# Patient Record
Sex: FEMALE | Race: BLACK OR AFRICAN AMERICAN | NOT HISPANIC OR LATINO | Employment: FULL TIME
[De-identification: names, ages, dates, MRNs, and addresses within clinical notes are randomized per-mention and may not be internally consistent; named-entity substitution may affect disease eponyms.]

---

## 2017-10-01 ENCOUNTER — HEALTH MAINTENANCE LETTER (OUTPATIENT)
Age: 27
End: 2017-10-01

## 2020-08-04 ENCOUNTER — OFFICE VISIT (OUTPATIENT)
Dept: FAMILY MEDICINE | Facility: CLINIC | Age: 30
End: 2020-08-04
Payer: COMMERCIAL

## 2020-08-04 VITALS
WEIGHT: 124 LBS | HEART RATE: 88 BPM | SYSTOLIC BLOOD PRESSURE: 103 MMHG | DIASTOLIC BLOOD PRESSURE: 62 MMHG | TEMPERATURE: 98.4 F | HEIGHT: 62 IN | BODY MASS INDEX: 22.82 KG/M2

## 2020-08-04 DIAGNOSIS — R26.89 BALANCE PROBLEMS: ICD-10-CM

## 2020-08-04 DIAGNOSIS — R29.818 ABNORMAL ROMBERG TEST: ICD-10-CM

## 2020-08-04 DIAGNOSIS — Z12.4 SCREENING FOR MALIGNANT NEOPLASM OF CERVIX: Primary | ICD-10-CM

## 2020-08-04 DIAGNOSIS — Z00.00 ROUTINE GENERAL MEDICAL EXAMINATION AT A HEALTH CARE FACILITY: ICD-10-CM

## 2020-08-04 LAB
BASOPHILS # BLD AUTO: 0 10E9/L (ref 0–0.2)
BASOPHILS NFR BLD AUTO: 0.4 %
DIFFERENTIAL METHOD BLD: NORMAL
EOSINOPHIL # BLD AUTO: 0.3 10E9/L (ref 0–0.7)
EOSINOPHIL NFR BLD AUTO: 3.2 %
ERYTHROCYTE [DISTWIDTH] IN BLOOD BY AUTOMATED COUNT: 12.6 % (ref 10–15)
HCT VFR BLD AUTO: 40.4 % (ref 35–47)
HGB BLD-MCNC: 14 G/DL (ref 11.7–15.7)
LYMPHOCYTES # BLD AUTO: 1.9 10E9/L (ref 0.8–5.3)
LYMPHOCYTES NFR BLD AUTO: 21.7 %
MCH RBC QN AUTO: 31 PG (ref 26.5–33)
MCHC RBC AUTO-ENTMCNC: 34.7 G/DL (ref 31.5–36.5)
MCV RBC AUTO: 89 FL (ref 78–100)
MONOCYTES # BLD AUTO: 0.5 10E9/L (ref 0–1.3)
MONOCYTES NFR BLD AUTO: 6 %
NEUTROPHILS # BLD AUTO: 6.1 10E9/L (ref 1.6–8.3)
NEUTROPHILS NFR BLD AUTO: 68.7 %
PLATELET # BLD AUTO: 254 10E9/L (ref 150–450)
RBC # BLD AUTO: 4.52 10E12/L (ref 3.8–5.2)
VIT B12 SERPL-MCNC: 2911 PG/ML (ref 193–986)
WBC # BLD AUTO: 8.9 10E9/L (ref 4–11)

## 2020-08-04 PROCEDURE — 99214 OFFICE O/P EST MOD 30 MIN: CPT | Mod: 25 | Performed by: NURSE PRACTITIONER

## 2020-08-04 PROCEDURE — G0145 SCR C/V CYTO,THINLAYER,RESCR: HCPCS | Performed by: NURSE PRACTITIONER

## 2020-08-04 PROCEDURE — 36415 COLL VENOUS BLD VENIPUNCTURE: CPT | Performed by: NURSE PRACTITIONER

## 2020-08-04 PROCEDURE — 80048 BASIC METABOLIC PNL TOTAL CA: CPT | Performed by: NURSE PRACTITIONER

## 2020-08-04 PROCEDURE — 82607 VITAMIN B-12: CPT | Performed by: NURSE PRACTITIONER

## 2020-08-04 PROCEDURE — 85025 COMPLETE CBC W/AUTO DIFF WBC: CPT | Performed by: NURSE PRACTITIONER

## 2020-08-04 PROCEDURE — 99385 PREV VISIT NEW AGE 18-39: CPT | Performed by: NURSE PRACTITIONER

## 2020-08-04 RX ORDER — UBIDECARENONE 75 MG
100 CAPSULE ORAL DAILY
COMMUNITY
End: 2020-08-25

## 2020-08-04 ASSESSMENT — MIFFLIN-ST. JEOR: SCORE: 1232.77

## 2020-08-04 NOTE — LETTER
August 7, 2020      Ramonita Trujillo  [address]   [place] [address]    Dear ,      I am happy to inform you that your recent cervical cancer screening test (PAP smear) was normal.      Preventative screenings such as this help to ensure your health for years to come. You should repeat a pap smear in 3 years, unless otherwise directed.      You will still need to return to the clinic every year for your annual exam and other preventive tests.     If you have additional questions regarding this result, please call our registered nurse, Fanny at [phone].      Sincerely,      BRENT Neri CNP//[facility]

## 2020-08-04 NOTE — PROGRESS NOTES
"   SUBJECTIVE:   CC: Ramonita Trujillo is an 30 year old woman who presents for preventive health visit.     Healthy Habits:    Do you get at least three servings of calcium containing foods daily (dairy, green leafy vegetables, etc.)? yes    Amount of exercise or daily activities, outside of work: 6 day(s) per week    Problems taking medications regularly No    Medication side effects: No    Have you had an eye exam in the past two years? no    Do you see a dentist twice per year? no    Do you have sleep apnea, excessive snoring or daytime drowsiness?no    Says she went to the chiropractor after having a very \" odd\" experience with her lower back.  Says she was at a barbecue and [facility] and suddenly she had lower back weakness and she was unable to even lift her plate onto the table.  She said it was not truly \" back pain\" but rather her lower back was \"giving out on her\".  She denies any upper or lower extremity weakness or numbness or tingling.  She denies any visual disturbance.  She has no headaches.  She has no gait abnormalities.  She admits that her balance is been off and when she was evaluated by the chiropractor he noted a positive Romberg test.  She says she has been \"off balance\" throughout her adult life although she notes that when she was younger she was well coordinated.  Her chiropractor wanted provider to run certain lab tests because sometimes vitamin B12 is associated with balance issues.  Since seeing the chiropractor she has started taking vitamin B supplements.      Today's PHQ-2 Score:   PHQ-2 ( 1999 [employer]) 10/7/2013 7/12/2012   Q1: Little interest or pleasure in doing things 0 0   Q2: Feeling down, depressed or hopeless 0 0   PHQ-2 Score 0 0       Abuse: Current or Past(Physical, Sexual or Emotional)- No  Do you feel safe in your environment? No        Social History     Tobacco Use     Smoking status: Never Smoker     Smokeless tobacco: Never Used   Substance " Use Topics     Alcohol use: No     If you drink alcohol do you typically have >3 drinks per day or >7 drinks per week? No                     Reviewed orders with patient.  Reviewed health maintenance and updated orders accordingly - Yes  Lab work is in process    Mammogram not appropriate for this patient based on age.    Pertinent mammograms are reviewed under the imaging tab.  History of abnormal Pap smear: NO - age 21-29 PAP every 3 years recommended  PAP / HPV 7/12/2012 5/19/2010   PAP NIL ASC-US(A)     Reviewed and updated as needed this visit by clinical staff         Reviewed and updated as needed this visit by Provider            ROS:  CONSTITUTIONAL: NEGATIVE for fever, chills, change in weight  INTEGUMENTARU/SKIN: NEGATIVE for worrisome rashes, moles or lesions  EYES: NEGATIVE for vision changes or irritation  ENT: NEGATIVE for ear, mouth and throat problems  RESP: NEGATIVE for significant cough or SOB  BREAST: NEGATIVE for masses, tenderness or discharge  CV: NEGATIVE for chest pain, palpitations or peripheral edema  GI: NEGATIVE for nausea, abdominal pain, heartburn, or change in bowel habits  : NEGATIVE for unusual urinary or vaginal symptoms. Periods are regular.  MUSCULOSKELETAL: Lower back discomfort  NEURO: Balance issues  PSYCHIATRIC: NEGATIVE for changes in mood or affect    OBJECTIVE:   There were no vitals taken for this visit.  EXAM:  GENERAL: healthy, alert and no distress  EYES: Eyes grossly normal to inspection, PERRL and conjunctivae and sclerae normal  HENT: ear canals and TM's normal, nose and mouth without ulcers or lesions  NECK: no adenopathy, no asymmetry, masses, or scars and thyroid normal to palpation  RESP: lungs clear to auscultation - no rales, rhonchi or wheezes  BREAST: normal without masses, tenderness or nipple discharge and no palpable axillary masses or adenopathy  CV: regular rate and rhythm, normal S1 S2, no S3 or S4, no murmur, click or rub, no peripheral edema and  peripheral pulses strong  ABDOMEN: soft, nontender, no hepatosplenomegaly, no masses and bowel sounds normal  MS: no gross musculoskeletal defects noted, no edema  SKIN: no suspicious lesions or rashes  NEURO: Normal strength and tone, sensory exam grossly normal, mentation intact, Romberg abnormal and she is unable to tandem walk without being off balance.  DTRs lower extremity normal bilaterally.  PSYCH: mentation appears normal, affect normal/bright  LYMPH: no cervical, supraclavicular, axillary, or inguinal adenopathy    Diagnostic Test Results:  Labs reviewed in Epic  Results for orders placed or performed in visit on 08/04/20 (from the past 24 hour(s))   Vitamin B12   Result Value Ref Range    Vitamin B12 2,911 (H) 193 - 986 pg/mL   CBC with platelets and differential   Result Value Ref Range    WBC 8.9 4.0 - 11.0 10e9/L    RBC Count 4.52 3.8 - 5.2 10e12/L    Hemoglobin 14.0 11.7 - 15.7 g/dL    Hematocrit 40.4 35.0 - 47.0 %    MCV 89 78 - 100 fl    MCH 31.0 26.5 - 33.0 pg    MCHC 34.7 31.5 - 36.5 g/dL    RDW 12.6 10.0 - 15.0 %    Platelet Count 254 150 - 450 10e9/L    % Neutrophils 68.7 %    % Lymphocytes 21.7 %    % Monocytes 6.0 %    % Eosinophils 3.2 %    % Basophils 0.4 %    Absolute Neutrophil 6.1 1.6 - 8.3 10e9/L    Absolute Lymphocytes 1.9 0.8 - 5.3 10e9/L    Absolute Monocytes 0.5 0.0 - 1.3 10e9/L    Absolute Eosinophils 0.3 0.0 - 0.7 10e9/L    Absolute Basophils 0.0 0.0 - 0.2 10e9/L    Diff Method Automated Method    Basic metabolic panel  (Ca, Cl, CO2, Creat, Gluc, K, Na, BUN)   Result Value Ref Range    Sodium 141 133 - 144 mmol/L    Potassium 4.6 3.4 - 5.3 mmol/L    Chloride 110 (H) 94 - 109 mmol/L    Carbon Dioxide 26 20 - 32 mmol/L    Anion Gap 5 3 - 14 mmol/L    Glucose 92 70 - 99 mg/dL    Urea Nitrogen 8 7 - 30 mg/dL    Creatinine 0.73 0.52 - 1.04 mg/dL    GFR Estimate >90 >60 mL/min/[1.73_m2]    GFR Estimate If Black >90 >60 mL/min/[1.73_m2]    Calcium 8.2 (L) 8.5 - 10.1 mg/dL  "      ASSESSMENT/PLAN:       ICD-10-CM    1. Screening for malignant neoplasm of cervix  Z12.4 Pap imaged thin layer screen reflex to HPV if ASCUS - recommend age 25 - 29   2. Routine general medical examination at a health care facility  Z00.00 HIV Screening   3. Abnormal Romberg test  R29.818 NEUROLOGY ADULT REFERRAL   4. Balance problems  R26.89 Vitamin B12     CBC with platelets and differential     Basic metabolic panel  (Ca, Cl, CO2, Creat, Gluc, K, Na, BUN)     NEUROLOGY ADULT REFERRAL       Routine physical today.  I am not entirely sure what is going on with her balance issues and whether or not there is a more serious cause for this.  Her Romberg was positive today.  Her B12 returned 3 times normal but she has been taking supplements which I advised the nurse to call patient and tell her to stop taking these.  I have referred her to neurology for further evaluation.  COUNSELING:   Reviewed preventive health counseling, as reflected in patient instructions       Regular exercise       Healthy diet/nutrition    Estimated body mass index is 24.01 kg/m  as calculated from the following:    Height as of 3/21/14: 1.537 m (5' 0.5\").    Weight as of 3/21/14: 56.7 kg (125 lb).         reports that she has never smoked. She has never used smokeless tobacco.      Counseling Resources:  ATP IV Guidelines  Pooled Cohorts Equation Calculator  Breast Cancer Risk Calculator  FRAX Risk Assessment  ICSI Preventive Guidelines  Dietary Guidelines for Americans, 2010  USDA's MyPlate  ASA Prophylaxis  Lung CA Screening    BRENT Neri [facility]  "

## 2020-08-05 ENCOUNTER — TELEPHONE (OUTPATIENT)
Dept: FAMILY MEDICINE | Facility: CLINIC | Age: 30
End: 2020-08-05

## 2020-08-05 DIAGNOSIS — R79.89 ELEVATED VITAMIN B12 LEVEL: Primary | ICD-10-CM

## 2020-08-05 LAB
ANION GAP SERPL CALCULATED.3IONS-SCNC: 5 MMOL/L (ref 3–14)
BUN SERPL-MCNC: 8 MG/DL (ref 7–30)
CALCIUM SERPL-MCNC: 8.2 MG/DL (ref 8.5–10.1)
CHLORIDE SERPL-SCNC: 110 MMOL/L (ref 94–109)
CO2 SERPL-SCNC: 26 MMOL/L (ref 20–32)
CREAT SERPL-MCNC: 0.73 MG/DL (ref 0.52–1.04)
GFR SERPL CREATININE-BSD FRML MDRD: >90 ML/MIN/{1.73_M2}
GLUCOSE SERPL-MCNC: 92 MG/DL (ref 70–99)
POTASSIUM SERPL-SCNC: 4.6 MMOL/L (ref 3.4–5.3)
SODIUM SERPL-SCNC: 141 MMOL/L (ref 133–144)

## 2020-08-05 NOTE — TELEPHONE ENCOUNTER
Lab appt scheduled for 9/21/20 and order for B12 placed for provider co-signature.    Isabelle Hameed RN

## 2020-08-06 LAB
COPATH REPORT: NORMAL
PAP: NORMAL

## 2020-08-24 NOTE — PROGRESS NOTES
INITIAL NEUROLOGY CONSULTATION    DATE OF VISIT: 8/25/2020  CLINIC LOCATION: [facility]  MRN: [id number]  PATIENT NAME: Ramonita Trujillo  YOB: 1990    PRIMARY CARE PROVIDER: Jim Bradley MD     REASON FOR VISIT:   Chief Complaint   Patient presents with     Balance/ Vestibular     HISTORY OF PRESENT ILLNESS:                                                    Ms. Ramonita Trujillo is 30 year old right handed female patient without significant past medical history, who was seen in consultation today requested by Corina Larry CNP, for balance difficulty.    Per patient's report, she developed urinary urgency in the beginning of 2017, it gradually improved, but continues to be an issue.  Then, later in the same year (2017) she started to experience difficulty with balance.  At that time she had very short couch and usually slept with crossed and bent legs for the entire night.  When she got up in the morning, her both legs felt weak, but it was resolving as the day went on.  She does not sleeping in this position for the last 2.5 years, but occasionally her legs tends to bend on their own at night, and she has to put conscious effort to straighten them.    Her balance difficulty continues.  She noticed that it is worse when she smokes marijuana (used to do it daily since 2009, but lately was not using it that frequently).  For the last 2.5 weeks did not smoke any until the last weekend and noticed that balance was better.  She used it again last weekend, and after that noticed balance difficulty again.  It worsens when she bends over and better when she is stretching.  In July she had an episode of significant low back pain that was associated with difficulty with balance.  No other aggravating or alleviating factors.  No other associated symptoms, including additional focal neurological complaints.  No history of significant head injuries, seizures, or CNS  infections.    She tried vitamin B12 replacement on the advice of her chiropractor, but stopped it after her vitamin B12 came back high.  No other treatments tried.    Recent laboratory evaluation from August 2020 includes elevated chloride (110) and low calcium (8.2) on BMP, elevated vitamin B12 level (2911), and normal CBC.    No prior brain or spine imaging.    No additional useful information is available in Care Everywhere, which was reviewed.    Review of Systems - the patient endorses easy skin bruising (previously discussed). Otherwise, she denies any other complaints on 14-point comprehensive review of systems.  PAST MEDICAL/SURGICAL HISTORY:                                                    I personally reviewed patient's past medical and surgical history with the patient at today's visit.  Patient Active Problem List   Diagnosis     CARDIOVASCULAR SCREENING; LDL GOAL LESS THAN 160     Past Medical History:   Diagnosis Date     Atypical squamous cells of undetermined significance (ASCUS) on Papanicolaou smear of cervix 5/19/2010 5/19/2010 ASCUS pap     Past Surgical History:   Procedure Laterality Date     NO HISTORY OF SURGERY       MEDICATIONS:                                                    I personally reviewed patient's medications and allergies with the patient at today's visit.  No current outpatient medications on file prior to visit.  No current facility-administered medications on file prior to visit.     ALLERGIES:                                                    No Known Allergies  FAMILY/SOCIAL HISTORY:                                                    Family and social history was reviewed with the patient at today's visit.  No family history of neurological disorders.  Never smoker.  Denies alcohol use.  Smokes marijuana.  Denies other recreational drug use.  Single, lives alone.  Works full-time in a warehouse.  REVIEW OF SYSTEMS:                                                     Patient has completed a [facility] Patient Health History, including a 14-system review, which was personally reviewed, and pertinent positives are listed in HPI. She denies any additional problems on the further questioning.  EXAM:                                                    VITAL SIGNS:   /60   Pulse 103   Temp 99.3  F (37.4  C) (Oral)   LMP 08/24/2020 (Exact Date)   SpO2 99%   Mini-Cog Assessment:  Mini Cog Assessment  Clock Draw Score: 2 Normal  3 Item Recall: 3 objects recalled  Mini Cog Total Score: 5    General: pt is in NAD, cooperative.  Skin: normal turgor, moist mucous membranes, no lesions/rashes noticed.  HEENT: ATNC, EOMI, PERRL, white sclera, normal conjunctiva, no nystagmus or ptosis. No carotid bruits bilaterally.  Respiratory: lung sounds clear to auscultation bilaterally, no crackles, wheezes, rhonchi. Symmetric lung excursion, no accessory respiratory muscle use.  Cardiovascular: normal S1/S2, no murmurs/rubs/gallops.   Abdomen: Not distended.  : deferred.    Neurological:  Mental: alert, follows commands, Mini Cog Total Score: 5/5 with 3/3 on memory recall, no aphasia or dysarthria. Fund of knowledge is appropriate for age.  Cranial Nerves:  CN II: visual acuity - able to accurately count fingers with each eye. Visual fields intact, fundi: discs sharp, no papilledema and normal vessels bilaterally.  CN III, IV, VI: EOM intact, pupils equal and reactive  CN V: facial sensation nl  CN VII: face symmetric, no facial droop  CN VIII: hearing normal  CN IX: palate elevation symmetric, uvula at midline  CN XI SCM normal, shoulder shrug nl  CN XII: tongue midline  Motor: Strength: 5/5 in all major groups of both upper extremities, 4/5 in both lower extremities. Normal tone. No abnormal movements. No pronator drift b/l.  Reflexes: Triceps, biceps, and brachioradialis reflexes are slightly brisk, but symmetric bilaterally, patellar and achilles reflexes are brisk, but also  symmetric. No sustained clonus noted, though 2-3 beats of ankle clonus were noted bilaterally. Toes are equivocal b/l.   Sensory: temperature, light touch, and pinprick are intact, and vibration sense is reduced in both feet. Romberg: negative, although the patient sways slightly side to side.  Coordination: FNF are intact, and heel-shin tests are dysmetric b/l.  Gait: Unsteady gait, has significant difficulty with tandem.  DATA:   LABS/IMAGING/OTHER STUDIES: I reviewed pertinent medical records, including Care Everywhere, as detailed in the history of present illness.  ASSESSMENT AND PLAN:      ASSESSMENT: Ramonita Trujillo is a 30 year old female patient with listed above past medical history, who presents with chronic balance difficulty.    We had a detailed discussion with the patient regarding her presenting complaints.  The neurological exam today is notable for unsteady gait, brisker lower extremity reflexes with mild bilateral lower extremity weakness and dysmetria.  The clinical presentation might be consistent with demyelinating conditions, lesions of the brain (especially cerebellum) or spinal cord, and others (metabolic disorders/vitamin deficiencies, marijuana use, etc.).  As a first step I ordered brain, cervical and thoracic spine with and without contrast.  I advised the patient to stop smoking marijuana completely.  We might add additional labs and potentially EMG if no causes are found.    DIAGNOSES:    ICD-10-CM    1. Difficulty balancing  R29.818      PLAN: At today's visit we thoroughly discussed various diagnostic possibilities for patient's symptoms, necessary evaluation, and the plan, which includes:  Orders Placed This Encounter   Procedures     MR Brain w/o & w Contrast     MR Cervical Spine w/o & w Contrast     MR Thoracic Spine w/o & w Contrast     No new medications.     Additional recommendations after the work-up.    Next follow-up appointment is in the next 2 weeks or earlier if  needed.    Total Time:  81 minutes with > 50% spent counseling the patient on stated above assessment and recommendations, including nature of the diagnosis, needed w/u, and proposed plan.  Additional time was used to answer questions regarding patient's symptoms, my recommendations, and the plan.    Earl Pritchett MD  [facility] Neurology  Neon  (Chart documentation was completed in part with Dragon voice-recognition software. Even though reviewed, some grammatical, spelling, and word errors may remain.)

## 2020-08-25 ENCOUNTER — OFFICE VISIT (OUTPATIENT)
Dept: NEUROLOGY | Facility: CLINIC | Age: 30
End: 2020-08-25
Payer: COMMERCIAL

## 2020-08-25 VITALS
SYSTOLIC BLOOD PRESSURE: 106 MMHG | HEART RATE: 103 BPM | OXYGEN SATURATION: 99 % | TEMPERATURE: 99.3 F | DIASTOLIC BLOOD PRESSURE: 60 MMHG

## 2020-08-25 DIAGNOSIS — R29.818 DIFFICULTY BALANCING: Primary | ICD-10-CM

## 2020-08-25 PROCEDURE — 99245 OFF/OP CONSLTJ NEW/EST HI 55: CPT | Performed by: PSYCHIATRY & NEUROLOGY

## 2020-08-25 NOTE — PATIENT INSTRUCTIONS
AFTER VISIT SUMMARY (AVS):    At today's visit we thoroughly discussed various diagnostic possibilities for your symptoms, necessary evaluation, and the plan, which includes:  Orders Placed This Encounter   Procedures     MR Brain w/o & w Contrast     MR Cervical Spine w/o & w Contrast     MR Thoracic Spine w/o & w Contrast     No new medications.     Additional recommendations after the work-up.    Next follow-up appointment is in the next 2 weeks or earlier if needed.    Please do not hesitate to call me with any questions or concerns.    Thanks.

## 2020-08-25 NOTE — LETTER
8/25/2020         RE: Ramonita Trujillo  [address]  [address]  [facility] [address]        Dear Colleague,    Thank you for referring your patient, Ramonita Trujillo, to the [facility]. Please see a copy of my visit note below.    INITIAL NEUROLOGY CONSULTATION    DATE OF VISIT: 8/25/2020  CLINIC LOCATION: [facility]  MRN: [id number]  PATIENT NAME: Ramonita Trujillo  YOB: 1990    PRIMARY CARE PROVIDER: Jim Bradley MD     REASON FOR VISIT:   Chief Complaint   Patient presents with     Balance/ Vestibular     HISTORY OF PRESENT ILLNESS:                                                    Ms. Ramnoita Trujillo is 30 year old right handed female patient without significant past medical history, who was seen in consultation today requested by Corina Larry CNP, for balance difficulty.    Per patient's report, she developed urinary urgency in the beginning of 2017, it gradually improved, but continues to be an issue.  Then, later in the same year (2017) she started to experience difficulty with balance.  At that time she had very short couch and usually slept with crossed and bent legs for the entire night.  When she got up in the morning, her both legs felt weak, but it was resolving as the day went on.  She does not sleeping in this position for the last 2.5 years, but occasionally her legs tends to bend on their own at night, and she has to put conscious effort to straighten them.    Her balance difficulty continues.  She noticed that it is worse when she smokes marijuana (used to do it daily since 2009, but lately was not using it that frequently).  For the last 2.5 weeks did not smoke any until the last weekend and noticed that balance was better.  She used it again last weekend, and after that noticed balance difficulty again.  It worsens when she bends over and better when she is stretching.  In July she had an episode of significant low  back pain that was associated with difficulty with balance.  No other aggravating or alleviating factors.  No other associated symptoms, including additional focal neurological complaints.  No history of significant head injuries, seizures, or CNS infections.    She tried vitamin B12 replacement on the advice of her chiropractor, but stopped it after her vitamin B12 came back high.  No other treatments tried.    Recent laboratory evaluation from August 2020 includes elevated chloride (110) and low calcium (8.2) on BMP, elevated vitamin B12 level (2911), and normal CBC.    No prior brain or spine imaging.    No additional useful information is available in Care Everywhere, which was reviewed.    Review of Systems - the patient endorses easy skin bruising (previously discussed). Otherwise, she denies any other complaints on 14-point comprehensive review of systems.  PAST MEDICAL/SURGICAL HISTORY:                                                    I personally reviewed patient's past medical and surgical history with the patient at today's visit.  Patient Active Problem List   Diagnosis     CARDIOVASCULAR SCREENING; LDL GOAL LESS THAN 160     Past Medical History:   Diagnosis Date     Atypical squamous cells of undetermined significance (ASCUS) on Papanicolaou smear of cervix 5/19/2010 5/19/2010 ASCUS pap     Past Surgical History:   Procedure Laterality Date     NO HISTORY OF SURGERY       MEDICATIONS:                                                    I personally reviewed patient's medications and allergies with the patient at today's visit.  No current outpatient medications on file prior to visit.  No current facility-administered medications on file prior to visit.     ALLERGIES:                                                    No Known Allergies  FAMILY/SOCIAL HISTORY:                                                    Family and social history was reviewed with the patient at today's visit.  No family  history of neurological disorders.  Never smoker.  Denies alcohol use.  Smokes marijuana.  Denies other recreational drug use.  Single, lives alone.  Works full-time in a warehouse.  REVIEW OF SYSTEMS:                                                    Patient has completed a [facility] Patient Health History, including a 14-system review, which was personally reviewed, and pertinent positives are listed in HPI. She denies any additional problems on the further questioning.  EXAM:                                                    VITAL SIGNS:   /60   Pulse 103   Temp 99.3  F (37.4  C) (Oral)   LMP 08/24/2020 (Exact Date)   SpO2 99%   Mini-Cog Assessment:  Mini Cog Assessment  Clock Draw Score: 2 Normal  3 Item Recall: 3 objects recalled  Mini Cog Total Score: 5    General: pt is in NAD, cooperative.  Skin: normal turgor, moist mucous membranes, no lesions/rashes noticed.  HEENT: ATNC, EOMI, PERRL, white sclera, normal conjunctiva, no nystagmus or ptosis. No carotid bruits bilaterally.  Respiratory: lung sounds clear to auscultation bilaterally, no crackles, wheezes, rhonchi. Symmetric lung excursion, no accessory respiratory muscle use.  Cardiovascular: normal S1/S2, no murmurs/rubs/gallops.   Abdomen: Not distended.  : deferred.    Neurological:  Mental: alert, follows commands, Mini Cog Total Score: 5/5 with 3/3 on memory recall, no aphasia or dysarthria. Fund of knowledge is appropriate for age.  Cranial Nerves:  CN II: visual acuity - able to accurately count fingers with each eye. Visual fields intact, fundi: discs sharp, no papilledema and normal vessels bilaterally.  CN III, IV, VI: EOM intact, pupils equal and reactive  CN V: facial sensation nl  CN VII: face symmetric, no facial droop  CN VIII: hearing normal  CN IX: palate elevation symmetric, uvula at midline  CN XI SCM normal, shoulder shrug nl  CN XII: tongue midline  Motor: Strength: 5/5 in all major groups of both upper  extremities, 4/5 in both lower extremities. Normal tone. No abnormal movements. No pronator drift b/l.  Reflexes: Triceps, biceps, and brachioradialis reflexes are slightly brisk, but symmetric bilaterally, patellar and achilles reflexes are brisk, but also symmetric. No sustained clonus noted, though 2-3 beats of ankle clonus were noted bilaterally. Toes are equivocal b/l.   Sensory: temperature, light touch, and pinprick are intact, and vibration sense is reduced in both feet. Romberg: negative, although the patient sways slightly side to side.  Coordination: FNF are intact, and heel-shin tests are dysmetric b/l.  Gait: Unsteady gait, has significant difficulty with tandem.  DATA:   LABS/IMAGING/OTHER STUDIES: I reviewed pertinent medical records, including Care Everywhere, as detailed in the history of present illness.  ASSESSMENT AND PLAN:      ASSESSMENT: Ramonita Trujillo is a 30 year old female patient with listed above past medical history, who presents with chronic balance difficulty.    We had a detailed discussion with the patient regarding her presenting complaints.  The neurological exam today is notable for unsteady gait, brisker lower extremity reflexes with mild bilateral lower extremity weakness and dysmetria.  The clinical presentation might be consistent with demyelinating conditions, lesions of the brain (especially cerebellum) or spinal cord, and others (metabolic disorders/vitamin deficiencies, marijuana use, etc.).  As a first step I ordered brain, cervical and thoracic spine with and without contrast.  I advised the patient to stop smoking marijuana completely.  We might add additional labs and potentially EMG if no causes are found.    DIAGNOSES:    ICD-10-CM    1. Difficulty balancing  R29.818      PLAN: At today's visit we thoroughly discussed various diagnostic possibilities for patient's symptoms, necessary evaluation, and the plan, which includes:  Orders Placed This Encounter    Procedures     MR Brain w/o & w Contrast     MR Cervical Spine w/o & w Contrast     MR Thoracic Spine w/o & w Contrast     No new medications.     Additional recommendations after the work-up.    Next follow-up appointment is in the next 2 weeks or earlier if needed.    Total Time:  81 minutes with > 50% spent counseling the patient on stated above assessment and recommendations, including nature of the diagnosis, needed w/u, and proposed plan.  Additional time was used to answer questions regarding patient's symptoms, my recommendations, and the plan.    Earl Pritchett MD  [facility]  [place]  (Chart documentation was completed in part with Dragon voice-recognition software. Even though reviewed, some grammatical, spelling, and word errors may remain.)            Again, thank you for allowing me to participate in the care of your patient.        Sincerely,        Earl Pritchett MD

## 2020-09-22 ENCOUNTER — ANCILLARY PROCEDURE (OUTPATIENT)
Dept: MRI IMAGING | Facility: CLINIC | Age: 30
End: 2020-09-22
Attending: PSYCHIATRY & NEUROLOGY
Payer: COMMERCIAL

## 2020-09-22 ENCOUNTER — OFFICE VISIT (OUTPATIENT)
Dept: FAMILY MEDICINE | Facility: CLINIC | Age: 30
End: 2020-09-22
Payer: COMMERCIAL

## 2020-09-22 VITALS
HEIGHT: 62 IN | BODY MASS INDEX: 23 KG/M2 | SYSTOLIC BLOOD PRESSURE: 108 MMHG | HEART RATE: 60 BPM | DIASTOLIC BLOOD PRESSURE: 62 MMHG | WEIGHT: 125 LBS

## 2020-09-22 DIAGNOSIS — D17.30 LIPOMA OF SKIN AND SUBCUTANEOUS TISSUE: Primary | ICD-10-CM

## 2020-09-22 DIAGNOSIS — R26.89 BALANCE PROBLEMS: ICD-10-CM

## 2020-09-22 DIAGNOSIS — R29.818 DIFFICULTY BALANCING: ICD-10-CM

## 2020-09-22 PROCEDURE — A9585 GADOBUTROL INJECTION: HCPCS | Mod: JW

## 2020-09-22 PROCEDURE — 72157 MRI CHEST SPINE W/O & W/DYE: CPT | Mod: TC

## 2020-09-22 PROCEDURE — 72156 MRI NECK SPINE W/O & W/DYE: CPT | Mod: TC

## 2020-09-22 PROCEDURE — 99214 OFFICE O/P EST MOD 30 MIN: CPT | Performed by: NURSE PRACTITIONER

## 2020-09-22 PROCEDURE — 70553 MRI BRAIN STEM W/O & W/DYE: CPT | Mod: TC

## 2020-09-22 RX ORDER — GADOBUTROL 604.72 MG/ML
7.5 INJECTION INTRAVENOUS ONCE
Status: COMPLETED | OUTPATIENT
Start: 2020-09-22 | End: 2020-09-22

## 2020-09-22 RX ADMIN — GADOBUTROL 5.5 ML: 604.72 INJECTION INTRAVENOUS at 11:10

## 2020-09-22 ASSESSMENT — MIFFLIN-ST. JEOR: SCORE: 1237.31

## 2020-09-22 NOTE — PROGRESS NOTES
Subjective     Ramonita Trujillo is a 30 year old female who presents to clinic today for the following health issues:    HPI     Patient had imaging completed today. Here for follow up.   Last seen by Corina 8/4/2020.     Concern - bump on lower back  Onset: had this since high school.   Description: looked like small black head in high school.   earlier this year she had her sister try to remove the back spot but was unsuccessful.  Feeling inflamed lately.   Will thob at times. Notices at night.   Wants dermatology referral to get this spot removed.     I last saw patient in August 2020 for a physical this was my first meeting with patient.  She reported some unsteady gait and balance issues.  I recommended she see neurology.  She had MRI of the brain, cervical spine and thoracic spine completed just this morning.  Results are in and it shows multiple white matter hyperintensities which is consistent with a demyelinating disease such as MS.  She is unaware of these results.  Provider did not discuss these results with patient today I advised patient that I would follow-up with neurology I see that she has an appointment next week for follow-up.        Review of Systems   Constitutional, HEENT, cardiovascular, pulmonary, GI, , musculoskeletal, neuro, skin, endocrine and psych systems are negative, except as otherwise noted.      Objective    LMP 08/24/2020 (Exact Date)   There is no height or weight on file to calculate BMI.  Physical Exam   GENERAL: healthy, alert and no distress  EYES: Eyes grossly normal to inspection, PERRL and conjunctivae and sclerae normal  RESP: lungs clear to auscultation - no rales, rhonchi or wheezes  CV: regular rate and rhythm, normal S1 S2, no S3 or S4, no murmur, click or rub, no peripheral edema and peripheral pulses strong  ABDOMEN: soft, nontender, no hepatosplenomegaly, no masses and bowel sounds normal  MS: no gross musculoskeletal defects noted, no edema  SKIN: Pea-sized  skin colored raised nontender lesion mid to low back.  NEURO: Normal strength and tone, mentation intact and speech normal  PSYCH: mentation appears normal, affect normal/bright    Results for orders placed or performed in visit on 09/22/20 (from the past 24 hour(s))   MR Thoracic Spine w/o & w Contrast    Narrative    MRI THORACIC SPINE WITHOUT AND WITH CONTRAST  9/22/2020 11:48 AM     HISTORY: Difficulty balancing.    TECHNIQUE: Multiplanar, multisequence MRI of the thoracic spine  without and with 5.5 mL Gadavist.    COMPARISON: None.    FINDINGS: Motion artifact degrades the quality of some of these  sequences. The thoracic cord is normal in size and it has normal  signal intensity. No definite T2 hyperintensities are seen within the  thoracic cord. No enhancing lesions are seen within the thoracic cord.  No evidence for active demyelination.     There are mild degenerative changes present. There is a small right  central disc protrusion at T9-T10.      Impression    IMPRESSION:    1. No definite cord lesions are identified. No enhancing lesions are  seen. No active demyelination is identified.  2. Mild degenerative changes.    MIRIAN SONG MD           Assessment & Plan     Lipoma of skin and subcutaneous tissue  Subcutaneous cyst I referred her to dermatology for removal  - DERMATOLOGY ADULT REFERRAL; Future    Balance problems  She just completed her brain cervical and thoracic MRI this morning.  She is unaware of the results.  I did reach out to Dr. Pritchett to advise of positive findings on brain MRI and that I did not discuss these results with patient today as it is more appropriate that these be discussed by him at his upcoming visit.         Return in about 3 months (around 12/22/2020).    BRENT Neri [facility]

## 2020-09-22 NOTE — Clinical Note
Mario Pritchett,  I referred this pt back to you in August for altered gait.  Today she presented to me with a skin  issue and unrelated.     She had her brain MRI this morning which I reviewed and showed: IMPRESSION:   1. Multiple white matter hyperintensities. This pattern is consistent  with a demyelinating disease such as multiple sclerosis.  2. No enhancing parenchymal lesions are identified. No active blood  brain barrier breakdown is identified.      I have NOT  discussed these results with patient I thought it would be best that they come from you.  I did let her know  that I would be reaching out to you.   Regards,

## 2020-09-25 NOTE — PROGRESS NOTES
ESTABLISHED PATIENT NEUROLOGY NOTE    DATE OF VISIT: 9/29/2020  CLINIC LOCATION: [facility]  MRN: [id number]  PATIENT NAME: Ramonita Trujillo  YOB: 1990    PCP: Jim Bradley MD    REASON FOR VISIT:   Chief Complaint   Patient presents with     Results     MRI      SUBJECTIVE:                                                      HISTORY OF PRESENT ILLNESS: Patient is here for a follow up regarding balance difficulty. Please refer to my initial note from 8/25/2020 for further information.    Since the last visit, the patient reports no significant changes in her symptoms.  Thinks that regular stretching helps.  She denies interval development of new focal neurological symptoms.    Brain MRI from 9/22/2020 demonstrated multiple white matter hyperintensities in a pattern felt consistent with demyelinating disease.  No enhancing parenchymal lesions were identified.  Cervical spine MRI with and without contrast from the same day demonstrated subtle T2 hyperintensities at the multiple levels (small T2 hyperintensity in the dorsal aspect of the cord at C2 level, hyperintensity in the right posterior cord at C5 level, hyperintensity is also seen in dorsal aspect of cord at C6 level).  No enhancing lesions were seen.  Thoracic spine MRI was unrevealing for any definite cord lesions.  Mild degenerative changes were noted.  All images were personally reviewed and discussed with the patient.    On review of systems, patient endorses no other active complaints. Medications, allergies, family and social history were also reviewed. There are no changes reported by patient.  REVIEW OF SYSTEMS:                                                    10-system review was completed. Pertinent positives are included in HPI. The remainder of ROS is negative.  EXAM:                                                    Physical Exam:   Vitals: /64 (BP Location: Right arm, Patient Position: Sitting,  Cuff Size: Adult Regular)   Pulse 92   Temp 98.1  F (36.7  C) (Oral)   Wt 59.7 kg (131 lb 9.6 oz)   SpO2 98%   BMI 24.46 kg/m      General: pt is in NAD, cooperative.  Skin: normal turgor, moist mucous membranes, no lesions/rashes noticed.  HEENT: ATNC, white sclera, normal conjunctiva.  Respiratory: Symmetric lung excursion, no accessory respiratory muscle use.  Abdomen: Non distended.  Neurological: awake, cooperative, follows commands, no exam changes compared to the initial visit.  ASSESSMENT AND PLAN:                                                    Assessment: 30-year-old female patient with balance difficulty and mild lower extremity weakness presents for a follow-up after the initial work-up, which was suspicious for demyelinating conditions.  We discussed with her additional differential, including NMO, thyroid dysfunction, autoimmune conditions, HTLV, HIV, and paraneoplastic syndromes.  For further diagnostic work-up, I ordered additional screening labs.  She would also benefit from physical therapy for balance training.  Once evaluation is completed, we will discuss treatment options (I provided information to review at home).  We should also plan to repeat her brain and cervical spine MRI in approximately 3-6 months from now or sooner if she develops new neurological symptoms.    Diagnoses:    ICD-10-CM    1. Demyelinating disease (H)  G37.9    2. Difficulty balancing  R29.818    3. Bilateral leg weakness  R29.898      Plan: At today's visit we thoroughly discussed various diagnostic possibilities for patient's symptoms based on evaluation results (MRI looks suspicious for multiple sclerosis or similar illness), additionally needed testing, and the plan, which includes:  Orders Placed This Encounter   Procedures     Vitamin D Deficiency     Neuromyelitis Optica AQP4 IgG w Reflex Blood     Angiotensin converting enzyme     HTLV I and 2 antibody with reflex     Lyme Disease Osiris with reflex to WB  Serum     TSH with free T4 reflex     HIV Antigen Antibody Combo     Paraneoplastic antibody     Erythrocyte sedimentation rate auto     Anti Nuclear Osiris IgG by IFA with Reflex     Hepatic panel     CRP inflammation     PHYSICAL THERAPY REFERRAL     I advised the patient to read about Copaxone, Aubagio, Tecfidera, and Gilenya on National Multiple Sclerosis Society website.  We will discuss these medications at next visit.    We will also plan to repeat her brain and cervical spine MRI with and without contrast in 3 to 6 months from now or sooner if she develops new neurological symptoms.    Next follow-up appointment is in the next 4 weeks or earlier if needed.    Total Time: 41 minutes with > 50% spent counseling the patient on stated above assessment and recommendations.    Earl Pritchett MD  [facility]/ Neurology

## 2020-09-25 NOTE — PATIENT INSTRUCTIONS
AFTER VISIT SUMMARY (AVS):    At today's visit we thoroughly discussed various diagnostic possibilities for your symptoms based on evaluation results (MRI looks suspicious for multiple sclerosis or similar illness), additionally needed testing, and the plan, which includes:  Orders Placed This Encounter   Procedures     Vitamin D Deficiency     Neuromyelitis Optica AQP4 IgG w Reflex Blood     Angiotensin converting enzyme     HTLV I and 2 antibody with reflex     Lyme Disease Osiris with reflex to WB Serum     TSH with free T4 reflex     HIV Antigen Antibody Combo     Paraneoplastic antibody     Erythrocyte sedimentation rate auto     Anti Nuclear Osiris IgG by IFA with Reflex     Hepatic panel     CRP inflammation     PHYSICAL THERAPY REFERRAL     Please read about Copaxone, Aubagio, Tecfidera, and Gilenya on National Multiple Sclerosis Society website.  We will discuss these medications at next visit.    We will also plan to repeat your brain and cervical spine MRI with and without contrast in 3 to 6 months from now or sooner if you develop new neurological symptoms.    Next follow-up appointment is in the next 4 weeks or earlier if needed.    Please do not hesitate to call me with any questions or concerns.    Thanks.

## 2020-09-29 ENCOUNTER — OFFICE VISIT (OUTPATIENT)
Dept: NEUROLOGY | Facility: CLINIC | Age: 30
End: 2020-09-29
Payer: COMMERCIAL

## 2020-09-29 VITALS
DIASTOLIC BLOOD PRESSURE: 64 MMHG | WEIGHT: 131.6 LBS | HEART RATE: 92 BPM | SYSTOLIC BLOOD PRESSURE: 102 MMHG | TEMPERATURE: 98.1 F | BODY MASS INDEX: 24.46 KG/M2 | OXYGEN SATURATION: 98 %

## 2020-09-29 DIAGNOSIS — G37.9 DEMYELINATING DISEASE (H): Primary | ICD-10-CM

## 2020-09-29 DIAGNOSIS — R29.818 DIFFICULTY BALANCING: ICD-10-CM

## 2020-09-29 DIAGNOSIS — R29.898 BILATERAL LEG WEAKNESS: ICD-10-CM

## 2020-09-29 DIAGNOSIS — E55.9 VITAMIN D DEFICIENCY: ICD-10-CM

## 2020-09-29 LAB
CRP SERPL-MCNC: <2.9 MG/L (ref 0–8)
ERYTHROCYTE [SEDIMENTATION RATE] IN BLOOD BY WESTERGREN METHOD: 8 MM/H (ref 0–20)

## 2020-09-29 PROCEDURE — 99215 OFFICE O/P EST HI 40 MIN: CPT | Performed by: PSYCHIATRY & NEUROLOGY

## 2020-09-29 PROCEDURE — 83520 IMMUNOASSAY QUANT NOS NONAB: CPT | Mod: 90 | Performed by: PSYCHIATRY & NEUROLOGY

## 2020-09-29 PROCEDURE — 86038 ANTINUCLEAR ANTIBODIES: CPT | Performed by: PSYCHIATRY & NEUROLOGY

## 2020-09-29 PROCEDURE — 36415 COLL VENOUS BLD VENIPUNCTURE: CPT | Performed by: PSYCHIATRY & NEUROLOGY

## 2020-09-29 PROCEDURE — 82306 VITAMIN D 25 HYDROXY: CPT | Performed by: PSYCHIATRY & NEUROLOGY

## 2020-09-29 PROCEDURE — 82164 ANGIOTENSIN I ENZYME TEST: CPT | Mod: 90 | Performed by: PSYCHIATRY & NEUROLOGY

## 2020-09-29 PROCEDURE — 86481 TB AG RESPONSE T-CELL SUSP: CPT | Performed by: PSYCHIATRY & NEUROLOGY

## 2020-09-29 PROCEDURE — 80076 HEPATIC FUNCTION PANEL: CPT | Performed by: PSYCHIATRY & NEUROLOGY

## 2020-09-29 PROCEDURE — 85652 RBC SED RATE AUTOMATED: CPT | Performed by: PSYCHIATRY & NEUROLOGY

## 2020-09-29 PROCEDURE — 86255 FLUORESCENT ANTIBODY SCREEN: CPT | Mod: 90 | Performed by: PSYCHIATRY & NEUROLOGY

## 2020-09-29 PROCEDURE — 86140 C-REACTIVE PROTEIN: CPT | Performed by: PSYCHIATRY & NEUROLOGY

## 2020-09-29 PROCEDURE — 99000 SPECIMEN HANDLING OFFICE-LAB: CPT | Performed by: PSYCHIATRY & NEUROLOGY

## 2020-09-29 PROCEDURE — 87389 HIV-1 AG W/HIV-1&-2 AB AG IA: CPT | Performed by: PSYCHIATRY & NEUROLOGY

## 2020-09-29 PROCEDURE — 83519 RIA NONANTIBODY: CPT | Mod: 90 | Performed by: PSYCHIATRY & NEUROLOGY

## 2020-09-29 PROCEDURE — 84443 ASSAY THYROID STIM HORMONE: CPT | Performed by: PSYCHIATRY & NEUROLOGY

## 2020-09-29 PROCEDURE — 86618 LYME DISEASE ANTIBODY: CPT | Performed by: PSYCHIATRY & NEUROLOGY

## 2020-09-29 PROCEDURE — 86790 VIRUS ANTIBODY NOS: CPT | Mod: 90 | Performed by: PSYCHIATRY & NEUROLOGY

## 2020-09-29 NOTE — LETTER
9/29/2020         RE: Ramonita Trujillo  [address]  [address]  [facility] [address]        Dear Colleague,    Thank you for referring your patient, Ramonita Trujillo, to the [facility]. Please see a copy of my visit note below.    ESTABLISHED PATIENT NEUROLOGY NOTE    DATE OF VISIT: 9/29/2020  CLINIC LOCATION: [facility]  MRN: [id number]  PATIENT NAME: Ramonita Trujillo  YOB: 1990    PCP: Jim Bradley MD    REASON FOR VISIT:   Chief Complaint   Patient presents with     Results     MRI      SUBJECTIVE:                                                      HISTORY OF PRESENT ILLNESS: Patient is here for a follow up regarding balance difficulty. Please refer to my initial note from 8/25/2020 for further information.    Since the last visit, the patient reports no significant changes in her symptoms.  Thinks that regular stretching helps.  She denies interval development of new focal neurological symptoms.    Brain MRI from 9/22/2020 demonstrated multiple white matter hyperintensities in a pattern felt consistent with demyelinating disease.  No enhancing parenchymal lesions were identified.  Cervical spine MRI with and without contrast from the same day demonstrated subtle T2 hyperintensities at the multiple levels (small T2 hyperintensity in the dorsal aspect of the cord at C2 level, hyperintensity in the right posterior cord at C5 level, hyperintensity is also seen in dorsal aspect of cord at C6 level).  No enhancing lesions were seen.  Thoracic spine MRI was unrevealing for any definite cord lesions.  Mild degenerative changes were noted.  All images were personally reviewed and discussed with the patient.    On review of systems, patient endorses no other active complaints. Medications, allergies, family and social history were also reviewed. There are no changes reported by patient.  REVIEW OF SYSTEMS:                                                     10-system review was completed. Pertinent positives are included in HPI. The remainder of ROS is negative.  EXAM:                                                    Physical Exam:   Vitals: /64 (BP Location: Right arm, Patient Position: Sitting, Cuff Size: Adult Regular)   Pulse 92   Temp 98.1  F (36.7  C) (Oral)   Wt 59.7 kg (131 lb 9.6 oz)   SpO2 98%   BMI 24.46 kg/m      General: pt is in NAD, cooperative.  Skin: normal turgor, moist mucous membranes, no lesions/rashes noticed.  HEENT: ATNC, white sclera, normal conjunctiva.  Respiratory: Symmetric lung excursion, no accessory respiratory muscle use.  Abdomen: Non distended.  Neurological: awake, cooperative, follows commands, no exam changes compared to the initial visit.  ASSESSMENT AND PLAN:                                                    Assessment: 30-year-old female patient with balance difficulty and mild lower extremity weakness presents for a follow-up after the initial work-up, which was suspicious for demyelinating conditions.  We discussed with her additional differential, including NMO, thyroid dysfunction, autoimmune conditions, HTLV, HIV, and paraneoplastic syndromes.  For further diagnostic work-up, I ordered additional screening labs.  She would also benefit from physical therapy for balance training.  Once evaluation is completed, we will discuss treatment options (I provided information to review at home).  We should also plan to repeat her brain and cervical spine MRI in approximately 3-6 months from now or sooner if she develops new neurological symptoms.    Diagnoses:    ICD-10-CM    1. Demyelinating disease (H)  G37.9    2. Difficulty balancing  R29.818    3. Bilateral leg weakness  R29.898      Plan: At today's visit we thoroughly discussed various diagnostic possibilities for patient's symptoms based on evaluation results (MRI looks suspicious for multiple sclerosis or similar illness), additionally needed testing,  and the plan, which includes:  Orders Placed This Encounter   Procedures     Vitamin D Deficiency     Neuromyelitis Optica AQP4 IgG w Reflex Blood     Angiotensin converting enzyme     HTLV I and 2 antibody with reflex     Lyme Disease Osiris with reflex to WB Serum     TSH with free T4 reflex     HIV Antigen Antibody Combo     Paraneoplastic antibody     Erythrocyte sedimentation rate auto     Anti Nuclear Osiris IgG by IFA with Reflex     Hepatic panel     CRP inflammation     PHYSICAL THERAPY REFERRAL     I advised the patient to read about Copaxone, Aubagio, Tecfidera, and Gilenya on National Multiple Sclerosis Society website.  We will discuss these medications at next visit.    We will also plan to repeat her brain and cervical spine MRI with and without contrast in 3 to 6 months from now or sooner if she develops new neurological symptoms.    Next follow-up appointment is in the next 4 weeks or earlier if needed.    Total Time: 41 minutes with > 50% spent counseling the patient on stated above assessment and recommendations.    Earl Pritchett MD  / Neurology      Again, thank you for allowing me to participate in the care of your patient.        Sincerely,        Earl Pritchett MD

## 2020-09-30 LAB
ACE SERPL-CCNC: 30 U/L (ref 9–67)
ALBUMIN SERPL-MCNC: 3.2 G/DL (ref 3.4–5)
ALP SERPL-CCNC: 46 U/L (ref 40–150)
ALT SERPL W P-5'-P-CCNC: 37 U/L (ref 0–50)
ANA SER QL IF: NEGATIVE
AQP4 H2O CHANNEL IGG TITR SERPL IF: NORMAL {TITER}
AST SERPL W P-5'-P-CCNC: 25 U/L (ref 0–45)
B BURGDOR IGG+IGM SER QL: 0.05 (ref 0–0.89)
BILIRUB DIRECT SERPL-MCNC: <0.1 MG/DL (ref 0–0.2)
BILIRUB SERPL-MCNC: 0.2 MG/DL (ref 0.2–1.3)
DEPRECATED CALCIDIOL+CALCIFEROL SERPL-MC: 19 UG/L (ref 20–75)
HIV 1+2 AB+HIV1 P24 AG SERPL QL IA: NONREACTIVE
HTLV I+II AB PATRN SER IB-IMP: NEGATIVE
PROT SERPL-MCNC: 6.4 G/DL (ref 6.8–8.8)
TSH SERPL DL<=0.005 MIU/L-ACNC: 1.03 MU/L (ref 0.4–4)

## 2020-10-01 LAB
GAMMA INTERFERON BACKGROUND BLD IA-ACNC: 0.02 IU/ML
M TB IFN-G CD4+ BCKGRND COR BLD-ACNC: 9.98 IU/ML
M TB TUBERC IFN-G BLD QL: NEGATIVE
MITOGEN IGNF BCKGRD COR BLD-ACNC: 0.03 IU/ML
MITOGEN IGNF BCKGRD COR BLD-ACNC: 0.03 IU/ML

## 2020-10-06 ENCOUNTER — VIRTUAL VISIT (OUTPATIENT)
Dept: FAMILY MEDICINE | Facility: CLINIC | Age: 30
End: 2020-10-06
Payer: COMMERCIAL

## 2020-10-06 DIAGNOSIS — E55.9 VITAMIN D DEFICIENCY: ICD-10-CM

## 2020-10-06 DIAGNOSIS — G37.9 DEMYELINATING DISEASE (H): Primary | ICD-10-CM

## 2020-10-06 PROCEDURE — 99214 OFFICE O/P EST MOD 30 MIN: CPT | Mod: 95 | Performed by: NURSE PRACTITIONER

## 2020-10-06 NOTE — PROGRESS NOTES
"Ramonita Trujillo is a 30 year old female who is being evaluated via a billable telephone visit.      The patient has been notified of following:     \"This telephone visit will be conducted via a call between you and your physician/provider. We have found that certain health care needs can be provided without the need for a physical exam.  This service lets us provide the care you need with a short phone conversation.  If a prescription is necessary we can send it directly to your pharmacy.  If lab work is needed we can place an order for that and you can then stop by our lab to have the test done at a later time.    Telephone visits are billed at different rates depending on your insurance coverage. During this emergency period, for some insurers they may be billed the same as an in-person visit.  Please reach out to your insurance provider with any questions.    If during the course of the call the physician/provider feels a telephone visit is not appropriate, you will not be charged for this service.\"     Patient has given verbal consent for Telephone visit?  Yes    What phone number would you like to be contacted at?     How would you like to obtain your AVS? Liyahhart    Subjective     Ramonita Trujillo is a 30 year old female who presents via phone visit today for the following health issues:    HPI       She was seen by neurology for balance issues.   She had MRI of the brain, cervical spine and thoracic spine completed. Results are in and it shows multiple white matter hyperintensities which is consistent with a demyelinating disease such as MS. She had additional blood work completed before making a final diagnosis.   Her understanding of her visit with neurology is that she may have MS. She says that she spoke with grandmother about this and her grandmother said that she herself always had balance and walking issues but she was never officially diagnosed with MS.   The neurologist advised her to look up " some specific medications. She says she is not going to take any medication. She is afraid of all the side effects.     Says she read up on natural ways to cure her including diet, removing metals, and stress from her life.     Vit D was low 19. reviewed other lab work with pt today other than specialty labs which will need to be reviewed by neurologist.     She has physical therapy scheduled needs day and time     Review of Systems   Constitutional, HEENT, cardiovascular, pulmonary, GI, , musculoskeletal, neuro, skin, endocrine and psych systems are negative, except as otherwise noted.       Objective          Vitals:  No vitals were obtained today due to virtual visit.    healthy, alert and no distress  PSYCH: Alert and oriented times 3; coherent speech, normal   rate and volume, able to articulate logical thoughts, able   to abstract reason, no tangential thoughts, no hallucinations   or delusions  Her affect is normal  RESP: No cough, no audible wheezing, able to talk in full sentences  Remainder of exam unable to be completed due to telephone visits     No results found for this or any previous visit (from the past 24 hour(s)).        Assessment/Plan:    Assessment & Plan     Demyelinating disease (H)  Still being worked up by neurology for definitive diagnosis but likely MS- pt is starting physical therapy. Today provided active listening. Patient is quite adamant she does not want to take medication. Recommended for her not to make any decisions on this until a diagnosis is made and risks benefits of therapeutics are discussed.     Vitamin D deficiency  Will start OTC vit D 2000 U daily           Return in about 1 month (around 11/6/2020), or if symptoms worsen or fail to improve.    BRENT Neri [facility]    Phone call duration:  15minutes

## 2020-10-08 ENCOUNTER — TELEPHONE (OUTPATIENT)
Dept: NEUROLOGY | Facility: CLINIC | Age: 30
End: 2020-10-08

## 2020-10-08 LAB — PNP ABY SERUM: NORMAL

## 2020-10-08 RX ORDER — CHOLECALCIFEROL (VITAMIN D3) 50 MCG
1 TABLET ORAL DAILY
Qty: 91 TABLET | Refills: 3 | Status: SHIPPED | OUTPATIENT
Start: 2020-10-08

## 2020-10-08 NOTE — TELEPHONE ENCOUNTER
"\"Please advise the patient that her vitamin D level is low, and she should start vitamin D replacement at 2000 IU/day (could buy over-the-counter or I could write a prescription if patient desires).  Her total protein is slightly low (non-concerning), but the rest of the labs are normal.  Thanks,  Earl Pritchett MD.\"    Patient would like a prescription to be sent to [facility] [address].     \"Please advise the patient that her prescription was sent to her preferred pharmacy.  She should recheck her vitamin D level in 2 months after starting the replacement.  Thanks,  Earl Pritchett MD.\"    Arline Prescott MA     "

## 2020-10-21 ENCOUNTER — HOSPITAL ENCOUNTER (OUTPATIENT)
Dept: PHYSICAL THERAPY | Facility: CLINIC | Age: 30
Setting detail: THERAPIES SERIES
End: 2020-10-21
Attending: PSYCHIATRY & NEUROLOGY
Payer: COMMERCIAL

## 2020-10-21 DIAGNOSIS — R29.898 BILATERAL LEG WEAKNESS: ICD-10-CM

## 2020-10-21 DIAGNOSIS — R29.818 DIFFICULTY BALANCING: ICD-10-CM

## 2020-10-21 PROCEDURE — 97161 PT EVAL LOW COMPLEX 20 MIN: CPT | Mod: GP | Performed by: PHYSICAL THERAPIST

## 2020-10-21 PROCEDURE — 97110 THERAPEUTIC EXERCISES: CPT | Mod: GP | Performed by: PHYSICAL THERAPIST

## 2020-10-21 PROCEDURE — 97530 THERAPEUTIC ACTIVITIES: CPT | Mod: GP | Performed by: PHYSICAL THERAPIST

## 2020-10-21 NOTE — DISCHARGE INSTRUCTIONS
10/21/20    Have nightlights and flashlights for walking around house or outside in the dark.    Continue to use good body mechanics and get help as needed at work.      See exercise handout.  Keep cool while exercising.      Enriqueta  PT  [phone]

## 2020-10-26 NOTE — PROGRESS NOTES
ESTABLISHED PATIENT NEUROLOGY NOTE    DATE OF VISIT: 10/27/2020  CLINIC LOCATION: [facility]  MRN: [id number]  PATIENT NAME: Ramonita Trujillo  YOB: 1990    PCP: BRENT Neri CNP    REASON FOR VISIT:   Chief Complaint   Patient presents with     Follow Up     SUBJECTIVE:                                                      HISTORY OF PRESENT ILLNESS: Patient is here for a follow up regarding demyelinating disease.  The last visit was on 9/29/2020. Please refer to my initial/other prior notes for further information.    Since the last visit, the patient reports no new symptoms.  Finds physical therapy helpful.  She denies interval development of new neurological symptoms.  She reviewed available treatment options and is ready to discuss them.    Additional laboratory work-up was unrevealing, except low vitamin D (19).  She was started on replacement.    On review of systems, patient endorses no additional active complaints. Medications, allergies, family and social history were also reviewed. There are no changes reported by patient.  REVIEW OF SYSTEMS:                                                    10-system review was completed. Pertinent positives are included in HPI. The remainder of ROS is negative.  EXAM:                                                    Physical Exam:   Vitals: /69   Pulse 87   Temp 98.7  F (37.1  C)   SpO2 100%     General: pt is in NAD, cooperative.  Skin: normal turgor, moist mucous membranes, no lesions/rashes noticed.  HEENT: ATNC, white sclera, normal conjunctiva.  Respiratory: Symmetric lung excursion, no accessory respiratory muscle use.  Abdomen: Non distended.  Neurological: awake, cooperative, follows commands, the rest of exam was deferred today.  ASSESSMENT AND PLAN:                                                    Assessment: 30-year-old female patient with balance difficulty and mild lower extremity weakness  presents for follow-up suspected demyelinating condition (multiple sclerosis).  Her additional laboratory work-up was unrevealing (excluding additional differential considerations).  I reviewed with the patient that most likely her condition is due to multiple sclerosis.  We reviewed treatment options and the plan.  I would like to repeat brain and cervical spine MRI in approximately 3 months from now unless she develops new neurological symptoms.    Diagnoses:    ICD-10-CM    1. Demyelinating disease (H)  G37.9      Plan: At today's visit we thoroughly discussed current symptoms, necessary evaluation, available treatment options, and the plan, which includes:  Orders Placed This Encounter   Procedures     MR Brain w/o & w Contrast     MR Cervical Spine w/o & w Contrast     We decided Copaxone.  I advised the patient to contact my clinic with any intolerable side effects (as discussed during today's visit).    Next follow-up appointment is in the next 3 months (after follow-up imaging completed) or earlier if needed.    Total Time: 41 minutes with > 50% spent counseling the patient on stated above assessment and recommendations.  Additional time was needed to discuss patient's symptoms, treatment options, and the proposed plan.    Earl Pritchett MD  / Neurology

## 2020-10-26 NOTE — PROGRESS NOTES
"   10/21/20 1400   General Information   Start of Care Date 10/21/20   Referring Physician HANK Pritchett MD   Orders Evaluate and Treat as Indicated   Order Date 09/29/20   Medical Diagnosis Difficulty balance / bilat LE weakness.     Onset of illness/injury or Date of Surgery 07/04/20   Surgical/Medical history reviewed Yes   Pertinent Visual History  a few years.  started w/ chiro, pcp, neuro. July 4 back weak.  varies. 2020.  i may loose balance.  dr looked at MRI.  ?MS.  i am used to the stumbling.  work in [email or web address], 60 or more lb bar lifting it wrong.  i learned how to do it better.  I can ask for assist.  job is moving cables.  reschooling wire, cutting wire.   is heat intolerant.  tried biking, in summer hard to get off bike.     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  better walking   General Information Comments went to [employer].  am done.  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    Fall Risk Screen   Fall screen completed by PT   Have you fallen 2 or more times in the past year? Yes   Have you fallen and had an injury in the past year? No   Is patient a fall risk? Yes;Department fall risk interventions implemented   Pain   Patient currently in pain No   Cognitive Status Examination   Orientation orientation to person, place and time   Level of Consciousness alert   Follows Commands and Answers Questions 100% of the time   Personal Safety and Judgment intact   Memory intact   Integumentary   Integumentary No deficits were identified   Posture   Posture Normal   Range of Motion (ROM)   ROM Comment wfls   Strength   Strength Comments R hip abd and flexion gr 4 to 4+ w/ sl ataxic motion.  R ankle pl flexion closus.     Bed Mobility   Bed Mobility Comments indep   Transfer " Skills   Transfer Comments indep   Locomotion   Wheel Chair Mobility Comments n/a   Balance   Balance Comments can stand w/ feet together, not sls   Sensory Examination   Sensory Perception no deficits were identified   Coordination   Coordination Comments not tested   Muscle Tone   Muscle Tone Comments clonus R ankle   Planned Therapy Interventions   Planned Therapy Interventions balance training;gait training;neuromuscular re-education;strengthening   Clinical Impression   Criteria for Skilled Therapeutic Interventions Met yes, treatment indicated   PT Diagnosis R sided weakness   Influenced by the following impairments weakness, imbalance, clonus   Functional limitations due to impairments gait difficulty   Clinical Presentation Stable/Uncomplicated   Clinical Decision Making (Complexity) Low complexity   Therapy Frequency other (see comments)   Predicted Duration of Therapy Intervention (days/wks) up to 8x in 3 months   Risk & Benefits of therapy have been explained Yes   Patient, Family & other staff in agreement with plan of care Yes   Clinical Impression Comments R sided weakness LE which impacts gait, clonus R ankle   Education Assessment   Preferred Learning Style Listening   Barriers to Learning Physical   GOALS   PT Eval Goals 1;2;3   Goal 1   Goal Identifier gait   Goal Description FGA to improve to 25 or more on testing for improved gait   Target Date 01/18/21   Goal 2   Goal Identifier HEP   Goal Description pt to be indep w/ a HEP for stretching , strengthening and aerobic exer   Target Date 01/18/21   Goal 3   Goal Identifier falls prev   Goal Description pt to verbalize 3+falls prevention ideas for safety.   Target Date 01/18/21   Total Evaluation Time   PT Leida Low Complexity Minutes (92358) 24

## 2020-10-26 NOTE — PATIENT INSTRUCTIONS
AFTER VISIT SUMMARY (AVS):    At today's visit we thoroughly discussed current symptoms, necessary evaluation, available treatment options, and the plan, which includes:  Orders Placed This Encounter   Procedures     MR Brain w/o & w Contrast     MR Cervical Spine w/o & w Contrast     We decided Copaxone. Please contact my clinic with any intolerable side effects (as discussed during today's visit).    Next follow-up appointment is in the next 3 months (after follow-up imaging completed) or earlier if needed.    Please do not hesitate to call me with any questions or concerns.    Thanks.

## 2020-10-27 ENCOUNTER — MEDICAL CORRESPONDENCE (OUTPATIENT)
Dept: HEALTH INFORMATION MANAGEMENT | Facility: CLINIC | Age: 30
End: 2020-10-27

## 2020-10-27 ENCOUNTER — OFFICE VISIT (OUTPATIENT)
Dept: NEUROLOGY | Facility: CLINIC | Age: 30
End: 2020-10-27
Payer: COMMERCIAL

## 2020-10-27 VITALS
SYSTOLIC BLOOD PRESSURE: 105 MMHG | DIASTOLIC BLOOD PRESSURE: 69 MMHG | HEART RATE: 87 BPM | OXYGEN SATURATION: 100 % | TEMPERATURE: 98.7 F

## 2020-10-27 DIAGNOSIS — G35 MULTIPLE SCLEROSIS (H): Primary | ICD-10-CM

## 2020-10-27 PROCEDURE — 99215 OFFICE O/P EST HI 40 MIN: CPT | Performed by: PSYCHIATRY & NEUROLOGY

## 2020-10-27 RX ORDER — GLATIRAMER 40 MG/ML
40 INJECTION, SOLUTION SUBCUTANEOUS
Qty: 15 SYRINGE | Refills: 4 | OUTPATIENT
Start: 2020-10-28 | End: 2020-11-03

## 2020-10-27 NOTE — PROGRESS NOTES
"   10/21/20 1400   General Information   Start of Care Date 10/21/20   Referring Physician HANK Pritchett MD   Orders Evaluate and Treat as Indicated   Order Date 09/29/20   Medical Diagnosis Difficulty balance / bilat LE weakness.     Onset of illness/injury or Date of Surgery 07/04/20   Surgical/Medical history reviewed Yes   Pertinent Visual History  a few years.  started w/ chiro, pcp, neuro. July 4 back weak.  varies. 2020.  i may loose balance.  dr looked at MRI.  ?MS.  i am used to the stumbling.  work in [employer], 60 or more lb bar lifting it wrong.  i learned how to do it better.  I can ask for assist.  job is moving cables.  reschooling wire, cutting wire.   is heat intolerant.  tried biking, in summer hard to get off bike.     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  better walking   General Information Comments went to [employer].  am done.  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    Fall Risk Screen   Fall screen completed by PT   Have you fallen 2 or more times in the past year? Yes   Have you fallen and had an injury in the past year? No   Is patient a fall risk? Yes;Department fall risk interventions implemented   Pain   Patient currently in pain No   Cognitive Status Examination   Orientation orientation to person, place and time   Level of Consciousness alert   Follows Commands and Answers Questions 100% of the time   Personal Safety and Judgment intact   Memory intact   Integumentary   Integumentary No deficits were identified   Posture   Posture Normal   Range of Motion (ROM)   ROM Comment wfls   Strength   Strength Comments R hip abd and flexion gr 4 to 4+ w/ sl ataxic motion.  R ankle pl flexion closus.     Bed Mobility   Bed Mobility Comments indep   Transfer " Skills   Transfer Comments indep   Locomotion   Wheel Chair Mobility Comments n/a   Gait Special Tests   Gait Special Tests 25 FOOT TIMED WALK;FUNCTIONAL GAIT ASSESSMENT   Gait Special Tests 25 Foot Timed Walk   Seconds 7.2   Gait Special Tests Functional Gait Assessment Score out of 30   Score out of 30 22   Balance   Balance Comments can stand w/ feet together, not sls   Sensory Examination   Sensory Perception no deficits were identified   Coordination   Coordination Comments not tested   Muscle Tone   Muscle Tone Comments clonus R ankle   Planned Therapy Interventions   Planned Therapy Interventions balance training;gait training;neuromuscular re-education;strengthening   Clinical Impression   Criteria for Skilled Therapeutic Interventions Met yes, treatment indicated   PT Diagnosis R sided weakness   Influenced by the following impairments weakness, imbalance, clonus   Functional limitations due to impairments gait difficulty   Clinical Presentation Stable/Uncomplicated   Clinical Decision Making (Complexity) Low complexity   Therapy Frequency other (see comments)   Predicted Duration of Therapy Intervention (days/wks) up to 8x in 3 months   Risk & Benefits of therapy have been explained Yes   Patient, Family & other staff in agreement with plan of care Yes   Clinical Impression Comments R sided weakness LE which impacts gait, clonus R ankle   Education Assessment   Preferred Learning Style Listening   Barriers to Learning Physical   GOALS   PT Eval Goals 1;2;3   Goal 1   Goal Identifier gait   Goal Description FGA to improve to 25 or more on testing for improved gait   Target Date 01/18/21   Goal 2   Goal Identifier HEP   Goal Description pt to be indep w/ a HEP for stretching , strengthening and aerobic exer   Target Date 01/18/21   Goal 3   Goal Identifier falls prev   Goal Description pt to verbalize 3+falls prevention ideas for safety.   Target Date 01/18/21   Total Evaluation Time   PT Eval, Low  Complexity Minutes (15915) 19

## 2020-10-27 NOTE — LETTER
10/27/2020         RE: Ramonita Trujillo  [address]  [address]  [facility] [address]        Dear Colleague,    Thank you for referring your patient, Ramonita Trujillo, to the [facility] [place]. Please see a copy of my visit note below.    ESTABLISHED PATIENT NEUROLOGY NOTE    DATE OF VISIT: 10/27/2020  CLINIC LOCATION: [facility]  MRN: [id number]  PATIENT NAME: Ramonita Trujillo  YOB: 1990    PCP: BRENT Neri CNP    REASON FOR VISIT:   Chief Complaint   Patient presents with     Follow Up     SUBJECTIVE:                                                      HISTORY OF PRESENT ILLNESS: Patient is here for a follow up regarding demyelinating disease.  The last visit was on 9/29/2020. Please refer to my initial/other prior notes for further information.    Since the last visit, the patient reports no new symptoms.  Finds physical therapy helpful.  She denies interval development of new neurological symptoms.  She reviewed available treatment options and is ready to discuss them.    Additional laboratory work-up was unrevealing, except low vitamin D (19).  She was started on replacement.    On review of systems, patient endorses no additional active complaints. Medications, allergies, family and social history were also reviewed. There are no changes reported by patient.  REVIEW OF SYSTEMS:                                                    10-system review was completed. Pertinent positives are included in HPI. The remainder of ROS is negative.  EXAM:                                                    Physical Exam:   Vitals: /69   Pulse 87   Temp 98.7  F (37.1  C)   SpO2 100%     General: pt is in NAD, cooperative.  Skin: normal turgor, moist mucous membranes, no lesions/rashes noticed.  HEENT: ATNC, white sclera, normal conjunctiva.  Respiratory: Symmetric lung excursion, no accessory respiratory muscle  use.  Abdomen: Non distended.  Neurological: awake, cooperative, follows commands, the rest of exam was deferred today.  ASSESSMENT AND PLAN:                                                    Assessment: 30-year-old female patient with balance difficulty and mild lower extremity weakness presents for follow-up suspected demyelinating condition (multiple sclerosis).  Her additional laboratory work-up was unrevealing (excluding additional differential considerations).  I reviewed with the patient that most likely her condition is due to multiple sclerosis.  We reviewed treatment options and the plan.  I would like to repeat brain and cervical spine MRI in approximately 3 months from now unless she develops new neurological symptoms.    Diagnoses:    ICD-10-CM    1. Demyelinating disease (H)  G37.9      Plan: At today's visit we thoroughly discussed current symptoms, necessary evaluation, available treatment options, and the plan, which includes:  Orders Placed This Encounter   Procedures     MR Brain w/o & w Contrast     MR Cervical Spine w/o & w Contrast     We decided Copaxone.  I advised the patient to contact my clinic with any intolerable side effects (as discussed during today's visit).    Next follow-up appointment is in the next 3 months (after follow-up imaging completed) or earlier if needed.    Total Time: 41 minutes with > 50% spent counseling the patient on stated above assessment and recommendations.  Additional time was needed to discuss patient's symptoms, treatment options, and the proposed plan.    Earl Pritchett MD  / Neurology        Again, thank you for allowing me to participate in the care of your patient.        Sincerely,        Earl Pritchett MD

## 2020-10-28 ENCOUNTER — TELEPHONE (OUTPATIENT)
Dept: NEUROLOGY | Facility: CLINIC | Age: 30
End: 2020-10-28

## 2020-10-28 NOTE — TELEPHONE ENCOUNTER
PA Initiation    Medication: glatiramer  Insurance Company: [facility] - Phone [phone] Fax [phone]  Pharmacy Filling the Rx: [facility] - [place], [place] - [facility] [address]  Filling Pharmacy Phone:    Filling Pharmacy Fax:    Start Date: 10/28/2020

## 2020-10-28 NOTE — TELEPHONE ENCOUNTER
Patient saw Dr Pritchett yesterday.  Patient would like to check with doctor to see if she is able to get a medical marijuana card.    Please call patient.  OK to DEBBIE on VM.

## 2020-10-28 NOTE — PROGRESS NOTES
[facility]        OUTPATIENT PHYSICAL THERAPY FUNCTIONAL EVALUATION  PLAN OF TREATMENT FOR OUTPATIENT REHABILITATION  (COMPLETE FOR INITIAL CLAIMS ONLY)  Patient's Last Name, First Name, M.I.  YOB: 1990  Ramonita Trujillo     Provider's Name   [facility]   Medical Record No.  [id number]     Start of Care Date:  10/21/20   Onset Date:  07/04/20   Type:     _X__PT   ____OT  ____SLP Medical Diagnosis:   Demyelinating/weakness     PT Diagnosis:  R sided weakness; gait difficulty Visits from SOC:  1                              __________________________________________________________________________________  Plan of Treatment/Functional Goals:  balance training, gait training, neuromuscular re-education, strengthening           GOALS  gait  FGA to improve to 25 or more on testing for improved gait  01/18/21    HEP  pt to be indep w/ a HEP for stretching , strengthening and aerobic exer  01/18/21    falls prev  pt to verbalize 3+falls prevention ideas for safety.  01/18/21    Therapy Frequency:  other (see comments)   Predicted Duration of Therapy Intervention:  up to 8x in 3 months    Enriqueta Willingham, PT                                    I CERTIFY THE NEED FOR THESE SERVICES FURNISHED UNDER        THIS PLAN OF TREATMENT AND WHILE UNDER MY CARE     (Physician co-signature of this document indicates review and certification of the therapy plan).                Certification Date From:   10/21/20   Certification Date To:   1/18/20    Referring Provider:  HANK Pritchett MD    Initial Assessment  See Epic Evaluation- Start of Care Date: 10/21/20

## 2020-10-28 NOTE — TELEPHONE ENCOUNTER
Informed patient to follow up with her PCP for medical card that she requested. I also informed that their is a out of pocket cost. Patient understood and will contact PCP.    Arline Prescott MA

## 2020-11-03 ENCOUNTER — HOSPITAL ENCOUNTER (OUTPATIENT)
Dept: PHYSICAL THERAPY | Facility: CLINIC | Age: 30
Setting detail: THERAPIES SERIES
End: 2020-11-03
Attending: PSYCHIATRY & NEUROLOGY
Payer: COMMERCIAL

## 2020-11-03 ENCOUNTER — VIRTUAL VISIT (OUTPATIENT)
Dept: FAMILY MEDICINE | Facility: CLINIC | Age: 30
End: 2020-11-03
Payer: COMMERCIAL

## 2020-11-03 DIAGNOSIS — G35 MULTIPLE SCLEROSIS (H): ICD-10-CM

## 2020-11-03 DIAGNOSIS — L81.9 DISCOLORATION OF SKIN OF TOE: Primary | ICD-10-CM

## 2020-11-03 DIAGNOSIS — G35 MS (MULTIPLE SCLEROSIS) (H): ICD-10-CM

## 2020-11-03 PROCEDURE — 97116 GAIT TRAINING THERAPY: CPT | Mod: GP | Performed by: PHYSICAL THERAPIST

## 2020-11-03 PROCEDURE — 97110 THERAPEUTIC EXERCISES: CPT | Mod: GP | Performed by: PHYSICAL THERAPIST

## 2020-11-03 PROCEDURE — 97112 NEUROMUSCULAR REEDUCATION: CPT | Mod: GP | Performed by: PHYSICAL THERAPIST

## 2020-11-03 PROCEDURE — 99214 OFFICE O/P EST MOD 30 MIN: CPT | Mod: 95 | Performed by: NURSE PRACTITIONER

## 2020-11-03 RX ORDER — GLATIRAMER ACETATE 20 MG/ML
20 INJECTION, SOLUTION SUBCUTANEOUS DAILY
Qty: 31 SYRINGE | Refills: 11 | OUTPATIENT
Start: 2020-11-03 | End: 2020-11-03

## 2020-11-03 RX ORDER — GLATIRAMER ACETATE 20 MG/ML
20 INJECTION, SOLUTION SUBCUTANEOUS DAILY
Qty: 31 SYRINGE | Refills: 11 | Status: SHIPPED | OUTPATIENT
Start: 2020-11-03 | End: 2022-09-30

## 2020-11-03 NOTE — TELEPHONE ENCOUNTER
PRIOR AUTHORIZATION DENIED    Medication: glatiramer/copaxone    Denial Date: 11/3/2020    Denial Rational: per call to plan, pt must try at least 2 of the following before glatiramer/copaxone will be approved:    AUBAGIO, AVONEX, BETASERON, GILENYA, and REBIF    Denial letter states a formulary alternative is copaxone so I called to clarify and was told that was an error and that it is in fact not formulary.

## 2020-11-03 NOTE — PROGRESS NOTES
"Ramonita Trujillo is a 30 year old female who is being evaluated via a billable telephone visit.      The patient has been notified of following:     \"This telephone visit will be conducted via a call between you and your physician/provider. We have found that certain health care needs can be provided without the need for a physical exam.  This service lets us provide the care you need with a short phone conversation.  If a prescription is necessary we can send it directly to your pharmacy.  If lab work is needed we can place an order for that and you can then stop by our lab to have the test done at a later time.    Telephone visits are billed at different rates depending on your insurance coverage. During this emergency period, for some insurers they may be billed the same as an in-person visit.  Please reach out to your insurance provider with any questions.    If during the course of the call the physician/provider feels a telephone visit is not appropriate, you will not be charged for this service.\"    Patient has given verbal consent for Telephone visit?  Yes    What phone number would you like to be contacted at? [phone]    How would you like to obtain your AVS?     Subjective     Ramonita Trujillo is a 30 year old female who presents via phone visit today for the following health issues:    HPI     Musculoskeletal problem/pain  Onset/Duration: a long time, getting worse lately   Description  Location: toes - right, second to pinky toes discoloration   Joint Swelling: no  Redness: discoloration   Pain: no, throbbing at times   Warmth: no  Intensity:  moderate  Progression of Symptoms:  same  Accompanying signs and symptoms:   Fevers: no  Numbness/tingling/weakness: no  History  Trauma to the area: no  Recent illness:  no  Previous similar problem: going on for years   Previous evaluation:  no  Precipitating or alleviating factors:  Aggravating factors include: none  Therapies tried and outcome: " nothing  Says she will be walking throughout the day then her toe starts throbbing out of the blue.   Says her pinky toe has always been discolored ever since she can remember.   Normal sensation, cool to touch.      New diagnosis of relapsing remitting MS  Insurance denied Glatiramer. She is scared to start medication because of side effects of medication, and she says she always grew up with the feelings that medications don't help. She is working with neurology. She wants to get hemp oil for her pain and wonders if she needs certification or RX?    Says she got over depression and SI. Doesn't want to get back to that dark place.    [facility].     Her chiropractor thinks she should see a nutritionist, that certain foods can help with MS dx           Review of Systems   Constitutional, HEENT, cardiovascular, pulmonary, GI, , musculoskeletal, neuro, skin, endocrine and psych systems are negative, except as otherwise noted.       Objective          Vitals:  No vitals were obtained today due to virtual visit.    healthy, alert and no distress  PSYCH: Alert and oriented times 3; coherent speech, normal   rate and volume, able to articulate logical thoughts, able   to abstract reason, no tangential thoughts, no hallucinations   or delusions  Her affect is normal  RESP: No cough, no audible wheezing, able to talk in full sentences  Remainder of exam unable to be completed due to telephone visits          Assessment/Plan:    ICD-10-CM    1. Discoloration of skin of toe  L81.9 Orthopedic & Spine  Referral   2. MS (multiple sclerosis) (H)  G35 NUTRITION REFERRAL     MENTAL HEALTH REFERRAL  - Adult; Outpatient Treatment; Individual/Couples/Family/Group Therapy/Health Psychology; [facility]: [facility] [phone]; We will contact you to schedule the appointment or please call with any questions     New diagnosis of relapsing/remitting MS followed by  neurology, Glatiramer denied by insurance sounds like neurologyclinic working on PA denial process. PATIENT very hesitant to start medication, concerned about side effects mostly and has a strong belief that medications are not the answer. Provided active listening today and recommend therapy, CBT to help with getting her on board with starting medication.   Referral to nutritional department to discuss diet.   Re: discolored toes this was a telephone visit her foot was not examined, recommend podiatry referral     Phone call duration:  19 minutes

## 2020-11-03 NOTE — Clinical Note
Fyi- insurances denies glatiramer for pt, pt still very hesitant to start medication, she agreed to speak with a therapist hoping this helps her get on board with starting medication.   Genny

## 2020-11-03 NOTE — TELEPHONE ENCOUNTER
Prior Authorization Approval    Authorization Effective Date: 10/4/2020  Authorization Expiration Date: 11/3/2021  Medication: copaxone  Approved Dose/Quantity: 1 month  Reference #: [id number]  Insurance Company: [employer] - Phone [phone] Fax [phone]  Expected CoPay:       CoPay Card Available: No    Foundation Assistance Needed: na  Which Pharmacy is filling the prescription (Not needed for infusion/clinic administered): [facility] - [place] [address]  Pharmacy Notified: Yes  Patient Notified:

## 2020-11-04 ENCOUNTER — TELEPHONE (OUTPATIENT)
Dept: NEUROLOGY | Facility: CLINIC | Age: 30
End: 2020-11-04

## 2020-11-04 ENCOUNTER — VIRTUAL VISIT (OUTPATIENT)
Dept: FAMILY MEDICINE | Facility: CLINIC | Age: 30
End: 2020-11-04
Payer: COMMERCIAL

## 2020-11-04 DIAGNOSIS — G35 MS (MULTIPLE SCLEROSIS) (H): Primary | ICD-10-CM

## 2020-11-04 PROCEDURE — 99441 PR PHYSICIAN TELEPHONE EVALUATION 5-10 MIN: CPT | Performed by: NURSE PRACTITIONER

## 2020-11-04 NOTE — Clinical Note
Ry,  Patient needs to complete JORGE A to fax neurology notes and MRI scans to [facility] in [place] for 2nd opinion. Can you please assist pt with this ?  Thanks you

## 2020-11-04 NOTE — PROGRESS NOTES
"Ramonita Trujillo is a 30 year old female who is being evaluated via a billable telephone visit.      The patient has been notified of following:     \"This telephone visit will be conducted via a call between you and your physician/provider. We have found that certain health care needs can be provided without the need for a physical exam.  This service lets us provide the care you need with a short phone conversation.  If a prescription is necessary we can send it directly to your pharmacy.  If lab work is needed we can place an order for that and you can then stop by our lab to have the test done at a later time.    Telephone visits are billed at different rates depending on your insurance coverage. During this emergency period, for some insurers they may be billed the same as an in-person visit.  Please reach out to your insurance provider with any questions.    If during the course of the call the physician/provider feels a telephone visit is not appropriate, you will not be charged for this service.\"    Patient has given verbal consent for Telephone visit?  Yes    What phone number would you like to be contacted at? [phone]    How would you like to obtain your AVS?     Subjective     Ramonita Trujillo is a 30 year old female who presents via phone visit today for the following health issues:    HPI     * referral to neurology for second opinion  Was recently diagnosed with relapsing and remitting MS by [facility] neurologist. She is so scared and hesitant to take medication.   She would like a 2nd neurology opinion.   She also made appointment with therapist as recommended by PCP yesterday     Review of Systems   Constitutional, HEENT, cardiovascular, pulmonary, GI, , musculoskeletal, neuro, skin, endocrine and psych systems are negative, except as otherwise noted.       Objective          Vitals:  No vitals were obtained today due to virtual visit.    healthy, alert and no distress  PSYCH: Alert and oriented " times 3; coherent speech, normal   rate and volume, able to articulate logical thoughts, able   to abstract reason, no tangential thoughts, no hallucinations   or delusions  Her affect is normal  RESP: No cough, no audible wheezing, able to talk in full sentences  Remainder of exam unable to be completed due to telephone visits          Assessment/Plan:    Assessment & Plan     MS (multiple sclerosis) (H)  Requests 2nd opinion referral placed for [facility] neurology   - NEUROLOGY ADULT REFERRAL            Return if symptoms worsen or fail to improve.    BRENT Neri [facility]    Phone call duration:  7minutes

## 2020-11-04 NOTE — TELEPHONE ENCOUNTER
Dr Pritchett,    [facility]    Pharmacist Corina calling.[facility] - [place], [place] - [address] Phone [phone]    Per Corina:  Pt does not want the Copaxone to be delivered to her  Pt may be getting a second opinion  Pt is not convinced she has MS and that she needs to be taking anything. Or perhaps she doesn't want to do injection, pharmacist not sure.     Nay Almonte, RN, BSN     [facility]

## 2020-11-04 NOTE — TELEPHONE ENCOUNTER
"Patient called back and stated the following:    \"At this moment I feel like I am being forced to take this mediation and I don't want to be on something that potentially will not help my symptoms. I do not want to be on a medication for the rest of my life and I just feel like I'm being cornered. I have an appointment with my primary to see what options and to fully go over my diagnosis. I will be scheduling an appointment with Matt for a second opinion. \"    Arline Prescott MA   "

## 2020-11-04 NOTE — TELEPHONE ENCOUNTER
SANAZM.    Instructed patient to call clinic back to clarify her concerns and preferences regarding treatment. Per Dr. Yarely Prescott MA

## 2020-11-10 NOTE — TELEPHONE ENCOUNTER
THis will be sent as FYI to Dr Pritchett.   Is there anything the triage RN needs to do here?    Nay Almonte, RN, BSN     [facility]

## 2020-11-11 ENCOUNTER — OFFICE VISIT (OUTPATIENT)
Dept: PODIATRY | Facility: CLINIC | Age: 30
End: 2020-11-11
Attending: NURSE PRACTITIONER
Payer: COMMERCIAL

## 2020-11-11 VITALS — HEART RATE: 90 BPM | SYSTOLIC BLOOD PRESSURE: 105 MMHG | DIASTOLIC BLOOD PRESSURE: 65 MMHG

## 2020-11-11 DIAGNOSIS — M21.6X9 ACQUIRED PRONATION DEFORMITY OF ANKLE, UNSPECIFIED LATERALITY: Primary | ICD-10-CM

## 2020-11-11 DIAGNOSIS — M21.619 BUNION: ICD-10-CM

## 2020-11-11 DIAGNOSIS — L81.9 DISCOLORATION OF SKIN OF TOE: ICD-10-CM

## 2020-11-11 PROCEDURE — 99203 OFFICE O/P NEW LOW 30 MIN: CPT | Performed by: PODIATRIST

## 2020-11-11 NOTE — PATIENT INSTRUCTIONS
We wish you continued good healing. If you have any questions or concerns, please do not hesitate to contact us at [phone]    [facility] (secure e-mail communication and access to your chart) to send a message or to make an appointment.    Please remember to call and schedule a follow up appointment if one was recommended at your earliest convenience.     +++OF MARCH 2020+++ LOCATION AND HOURS HAVE CHANGED    PLEASE CALL CLINICS TO VERIFY DAYS AND TIMES  PODIATRY CLINIC HOURS  TELEPHONE NUMBER    Dr. Jim WHITEPMEME [facility]        Clinics:  [facility]   Tuesday 1PM-6PM  [place]  Wednesday 745AM-330PM  [place]  Thursday/Friday 745AM-230PM  [place]     Specialty schedulers:   [phone] to make an appointment with any Specialty Provider.               If you need a medication refill, please contact us you may need lab work and/or a follow up visit prior to your refill (i.e. Antifungal medications).    If MRI needed please call Imaging at [phone] or [phone]    Knee scooters can be picked up at ANY Medical Supply stores with your knee scooter order. For a list of suppliers please ask.

## 2020-11-11 NOTE — TELEPHONE ENCOUNTER
Earl Pritchett MD Peterson, Kristine A, RN   Caller: Unspecified (1 week ago, 10:50 AM)             No.   Thanks,   Earl Pritchett MD     This encounter will be closed    Nay Almonte RN

## 2020-11-11 NOTE — LETTER
11/11/2020         RE: Ramonita Trujillo  [address]  [address]  [facility] [address]        Dear Colleague,    Thank you for referring your patient, Ramonita Trujillo, to the [facility]. Please see a copy of my visit note below.     Subjective:    Pt is seen today as a new pt with numerous concerns.  She notices that both of her second nails are thick and discolored.  She has had this for many years.  Also affecting her other lesser nails especially her fifth.  She denies any pain with this.  She is try to keep these filed down.  She cannot remember any significant history about trauma.  She also has bunions that are occasionally painful.  Points to medial head of first metatarsal.  Has pain w/ ambulation and shoewear and is relieved by rest and going barefoot.  Patient works at a job where she is standing all day.  She has never had an arch support for her feet.  She states she had a pair of shoes in the past that bothered her pronated feet but these other shoes are better.  She is recently been diagnosed with MS.  She denies any ecchymosis erythema edema numbness     ROS:   A 10-point review of systems was performed and is positive for that noted in the HPI and as seen below.  All other areas are negative.      No Known Allergies    Current Outpatient Medications   Medication Sig Dispense Refill     glatiramer (COPAXONE) 20 MG/ML injection Inject 1 mL (20 mg) Subcutaneous daily (Patient not taking: Reported on 11/4/2020) 31 Syringe 11     vitamin D3 (CHOLECALCIFEROL) 50 mcg (2000 units) tablet Take 1 tablet (50 mcg) by mouth daily 91 tablet 3       Patient Active Problem List   Diagnosis     CARDIOVASCULAR SCREENING; LDL GOAL LESS THAN 160     MS (multiple sclerosis) (H)       Past Medical History:   Diagnosis Date     Atypical squamous cells of undetermined significance (ASCUS) on Papanicolaou smear of cervix 5/19/2010 5/19/2010 ASCUS pap       Past Surgical History:    Procedure Laterality Date     NO HISTORY OF SURGERY         Family History   Problem Relation Age of Onset     Blood Disease Mother         HIV     Blood Disease Maternal Grandmother      Unknown/Adopted Father      Unknown/Adopted Paternal Grandmother      Unknown/Adopted Paternal Grandfather        Social History     Tobacco Use     Smoking status: Never Smoker     Smokeless tobacco: Never Used   Substance Use Topics     Alcohol use: No       Objective:    O:  /65   Pulse 90 .      Constitutional/ general:  Pt is in no apparent distress, appears well-nourished.  Cooperative with history and physical exam.     Psych:  The patient answered questions appropriately.  Normal affect.  Seems to have reasonable expectations, in terms of treatment.     Eyes:  Visual scanning/ tracking without deficit.    Ears:  Response to auditory stimuli is normal.  No hearing aid devices.  Auricles in proper alignment.     Lymphatic:  Popliteal lymph nodes not enlarged.     Lungs:  Non labored breathing, non labored speech. No cough.  No audible wheezing. Even, quiet breathing.       Vascular:  Pedal pulses are palpable bilaterally for both the DP and PT arteries.  CFT < 3 sec.  No edema.  Pedal hair growth noted.     Neuro:  Alert and oriented x 3. Coordinated gait.  Light touch sensation is intact to the L4, L5, S1 distributions. No obvious deficits.  No evidence of neurological-based weakness, spasticity, or contracture in the lower extremities.     Derm: Normal texture and turgor.  No erythema, ecchymosis, or cyanosis.  No open lesions.   Patient has long second toes bilaterally.  Bilateral second and fifth nails and to a lesser extent third and fourth nails are thickened and somewhat discolored.  No subungual debris noted.    Musculoskeletal:    Lower extremity muscle strength is normal.  Patient is ambulatory without an assistive device or brace.    Significantly pronated arch with weightbearing.   Patient has mobile  foot.  No equinus.   Bilateral bunion deformity noted.  No pain with range of motion.  Negative tracking with ROM.  No medial bursa or masses noted.  No pain on the sesamoids or dorsally.        Assessment:  Hallux abducto valgus deformity right and left                         Bilateral pronation                         Bilateral onychodystrophy    Plan: Explained to patient how increased pressure on these lesser digits from the mechanics of her foot are causing pressure on the toes.  She will try to make sure shoes are wide enough.  She will keep these filed short as possible.      Explained to patient that a bunion is caused by a muscle imbalance. The big toe is pulled toward the smaller toes. The lump is created by a bone pushing outward.   Bunion pain is usually a combination of shoes rubbing on the skin, nerve irritation, compression between the toes, joint misalignment, arthritis, and altered gait.   Most bunion pain can be improved by wearing compatible shoes.  Patient to get wide stiff supportive shoes we made suggestions.  Dispensed toe .  Discussed orthotics.  I believe this would be a good idea for her because of her pronation as well.  Wrote a prescription for orthotics.    Return to clinic prn.    Jim Marie DPM DPM, FACFAS        Again, thank you for allowing me to participate in the care of your patient.        Sincerely,        Jim Marie DPM

## 2020-11-11 NOTE — PROGRESS NOTES
Subjective:    Pt is seen today as a new pt with numerous concerns.  She notices that both of her second nails are thick and discolored.  She has had this for many years.  Also affecting her other lesser nails especially her fifth.  She denies any pain with this.  She is try to keep these filed down.  She cannot remember any significant history about trauma.  She also has bunions that are occasionally painful.  Points to medial head of first metatarsal.  Has pain w/ ambulation and shoewear and is relieved by rest and going barefoot.  Patient works at a job where she is standing all day.  She has never had an arch support for her feet.  She states she had a pair of shoes in the past that bothered her pronated feet but these other shoes are better.  She is recently been diagnosed with MS.  She denies any ecchymosis erythema edema numbness     ROS:   A 10-point review of systems was performed and is positive for that noted in the HPI and as seen below.  All other areas are negative.      No Known Allergies    Current Outpatient Medications   Medication Sig Dispense Refill     glatiramer (COPAXONE) 20 MG/ML injection Inject 1 mL (20 mg) Subcutaneous daily (Patient not taking: Reported on 11/4/2020) 31 Syringe 11     vitamin D3 (CHOLECALCIFEROL) 50 mcg (2000 units) tablet Take 1 tablet (50 mcg) by mouth daily 91 tablet 3       Patient Active Problem List   Diagnosis     CARDIOVASCULAR SCREENING; LDL GOAL LESS THAN 160     MS (multiple sclerosis) (H)       Past Medical History:   Diagnosis Date     Atypical squamous cells of undetermined significance (ASCUS) on Papanicolaou smear of cervix 5/19/2010 5/19/2010 ASCUS pap       Past Surgical History:   Procedure Laterality Date     NO HISTORY OF SURGERY         Family History   Problem Relation Age of Onset     Blood Disease Mother         HIV     Blood Disease Maternal Grandmother      Unknown/Adopted Father      Unknown/Adopted Paternal Grandmother      Unknown/Adopted  Paternal Grandfather        Social History     Tobacco Use     Smoking status: Never Smoker     Smokeless tobacco: Never Used   Substance Use Topics     Alcohol use: No       Objective:    O:  /65   Pulse 90 .      Constitutional/ general:  Pt is in no apparent distress, appears well-nourished.  Cooperative with history and physical exam.     Psych:  The patient answered questions appropriately.  Normal affect.  Seems to have reasonable expectations, in terms of treatment.     Eyes:  Visual scanning/ tracking without deficit.    Ears:  Response to auditory stimuli is normal.  No hearing aid devices.  Auricles in proper alignment.     Lymphatic:  Popliteal lymph nodes not enlarged.     Lungs:  Non labored breathing, non labored speech. No cough.  No audible wheezing. Even, quiet breathing.       Vascular:  Pedal pulses are palpable bilaterally for both the DP and PT arteries.  CFT < 3 sec.  No edema.  Pedal hair growth noted.     Neuro:  Alert and oriented x 3. Coordinated gait.  Light touch sensation is intact to the L4, L5, S1 distributions. No obvious deficits.  No evidence of neurological-based weakness, spasticity, or contracture in the lower extremities.     Derm: Normal texture and turgor.  No erythema, ecchymosis, or cyanosis.  No open lesions.   Patient has long second toes bilaterally.  Bilateral second and fifth nails and to a lesser extent third and fourth nails are thickened and somewhat discolored.  No subungual debris noted.    Musculoskeletal:    Lower extremity muscle strength is normal.  Patient is ambulatory without an assistive device or brace.    Significantly pronated arch with weightbearing.   Patient has mobile foot.  No equinus.   Bilateral bunion deformity noted.  No pain with range of motion.  Negative tracking with ROM.  No medial bursa or masses noted.  No pain on the sesamoids or dorsally.        Assessment:  Hallux abducto valgus deformity right and left                          Bilateral pronation                         Bilateral onychodystrophy    Plan: Explained to patient how increased pressure on these lesser digits from the mechanics of her foot are causing pressure on the toes.  She will try to make sure shoes are wide enough.  She will keep these filed short as possible.      Explained to patient that a bunion is caused by a muscle imbalance. The big toe is pulled toward the smaller toes. The lump is created by a bone pushing outward.   Bunion pain is usually a combination of shoes rubbing on the skin, nerve irritation, compression between the toes, joint misalignment, arthritis, and altered gait.   Most bunion pain can be improved by wearing compatible shoes.  Patient to get wide stiff supportive shoes we made suggestions.  Dispensed toe .  Discussed orthotics.  I believe this would be a good idea for her because of her pronation as well.  Wrote a prescription for orthotics.    Return to clinic prn.    Jim Marie, GLADYS DPMILIND, FACFAS

## 2020-11-13 ENCOUNTER — TELEPHONE (OUTPATIENT)
Dept: FAMILY MEDICINE | Facility: CLINIC | Age: 30
End: 2020-11-13

## 2020-11-13 NOTE — TELEPHONE ENCOUNTER
Reason for Call:  Other call back    Detailed comments: Ange from [employer] states states they need a verbal order sent to pharmacy for glatiramer (COPAXONE) 20 , [phone]     Phone Number Patient can be reached at: Other phone number:  [phone]    Best Time: asap    Can we leave a detailed message on this number? YES    Call taken on 11/13/2020 at 9:14 AM by Leyla Higgins

## 2020-11-13 NOTE — TELEPHONE ENCOUNTER
Spoke to patient about pharmacies request. Patient states that she has not yet decided on whether she wants to start the injections. She is following up with a nutrition specialist and psychiatrist to discuss options before making a decision.     I will call pharmacy to inform that medication will be on hold until further notice.     Arline Prescott MA

## 2020-11-17 ENCOUNTER — HOSPITAL ENCOUNTER (OUTPATIENT)
Dept: PHYSICAL THERAPY | Facility: CLINIC | Age: 30
Setting detail: THERAPIES SERIES
End: 2020-11-17
Attending: PSYCHIATRY & NEUROLOGY
Payer: COMMERCIAL

## 2020-11-17 PROCEDURE — 97110 THERAPEUTIC EXERCISES: CPT | Mod: GP | Performed by: PHYSICAL THERAPIST

## 2020-11-20 ENCOUNTER — VIRTUAL VISIT (OUTPATIENT)
Dept: ONCOLOGY | Facility: CLINIC | Age: 30
End: 2020-11-20
Attending: NURSE PRACTITIONER
Payer: COMMERCIAL

## 2020-11-20 DIAGNOSIS — G35 MS (MULTIPLE SCLEROSIS) (H): Primary | ICD-10-CM

## 2020-11-20 PROCEDURE — 999N001193 HC VIDEO/TELEPHONE VISIT; NO CHARGE

## 2020-11-20 PROCEDURE — 97802 MEDICAL NUTRITION INDIV IN: CPT | Mod: TEL | Performed by: DIETITIAN, REGISTERED

## 2020-11-20 NOTE — LETTER
"    11/20/2020         RE: Ramonita Trujillo  [address]  [address]  [place] [address]        Dear Colleague,    Thank you for referring your patient, Ramonita Trujillo, to the [facility]. Please see a copy of my visit note below.    Ramonita Trujillo is a 30 year old female who is being evaluated via a billable telephone visit.      The patient has been notified of following:     \"This telephone visit will be conducted via a call between you and your physician/provider. We have found that certain health care needs can be provided without the need for a physical exam.  This service lets us provide the care you need with a short phone conversation.  If a prescription is necessary we can send it directly to your pharmacy.  If lab work is needed we can place an order for that and you can then stop by our lab to have the test done at a later time.    Telephone visits are billed at different rates depending on your insurance coverage. During this emergency period, for some insurers they may be billed the same as an in-person visit.  Please reach out to your insurance provider with any questions.    If during the course of the call the physician/provider feels a telephone visit is not appropriate, you will not be charged for this service.\"    Patient has given verbal consent for Telephone visit?  Yes    CLINICAL NUTRITION SERVICES - ASSESSMENT NOTE    Ramonita Trujillo 30 year old referred for MNT related to MS    Time Spent: 45 minutes  Visit Type: telephone  Referring Physician: Corina Lehman CNP  Pt accompanied by: self    NUTRITION HISTORY  Factors affecting nutrition intake include: none at this time  Current diet: lacto-ovo vegetarian  Current appetite/intake: kylee Shipman presents today with desire to learn about food choices with MS.   She understands that certain foods can be inflammatory and anti-inflammatory.    She has recently switched to a vegetarian diet, " "only eating dairy.    She has not eaten red meat in over a year as she does not like it.   She eats a lot of greens and fruits.  She has limited coffee to < 1 cup/week.   She dines out/fast food occasionally at [facility] or other locations.    Diet Recall  Breakfast Fruit - berries, banana, almonds, +/- yogurt   Lunch Salad greens or Henri Johns    Dinner Cooked greens, turnips, chick peas   Snacks Cheese, fruit or yogurt   Beverages Water     ANTHROPOMETRICS  Height: 61\"  Weight: 131 lb/59kg  BMI: 24  Weight Status:  Normal BMI  IBW: 105 lb (124%)  Weight History:   Wt Readings from Last 5 Encounters:   09/29/20 59.7 kg (131 lb 9.6 oz)   09/22/20 56.7 kg (125 lb)   08/04/20 56.2 kg (124 lb)   03/21/14 56.7 kg (125 lb)   10/07/13 57.6 kg (127 lb)     Dosing Weight: 50kg    Medications/vitamins/minerals/herbals:   Reviewed    Labs:   Labs reviewed    NUTRITION FOCUSED PHYSICAL ASSESSMENT FOR DIAGNOSING MALNUTRITION:  Consult for education only    ASSESSED NUTRITION NEEDS:  Estimated Energy Needs: 4018-2858 kcals (25-30 Kcal/Kg)  Justification: maintenance  Estimated Protein Needs: 60 grams protein (1-1.2 g pro/Kg)  Justification: maintenance  Estimated Fluid Needs: 1500  mL   Justification: maintenance    NUTRITION DIAGNOSIS:  Food and nutrition-related knowledge deficit related to food choices with MS as evidenced by pt with questions regarding optimal food choices to prevent imflammation.     INTERVENTIONS  Provided written & verbal education:   - Discussed anti-inflammatory and inflammatory food choices. Reviewed and encouraged Mediterranean diet guidelines. Encouraged fruits, vegetables, whole grains, flax, bran, omega 3 fish oil and yogurt.   - Vegetarian diet - reviewed ways to complement proteins if she chooses vegan.   - Advised pt to aim for at least 1500kcal and 60g protein daily.   - Suggested she start taking omega 3 fish oil and Kefir (Probiotic). She will take a probiotic upon her inquiry for gut " health.   Pt verbalize understanding of materials provided during consult.   Patient Understanding: Excellent  Expected patient engagement: Excellent     Follow-Up Plans: Pt has RD contact information for questions.      MONITORING AND EVALUATION:  -Food/beverage intake  -Weight trends    Iraida Glass RD, LD          Again, thank you for allowing me to participate in the care of your patient.        Sincerely,        Iraida Glass RD

## 2020-11-20 NOTE — PROGRESS NOTES
"Ramonita Trujillo is a 30 year old female who is being evaluated via a billable telephone visit.      The patient has been notified of following:     \"This telephone visit will be conducted via a call between you and your physician/provider. We have found that certain health care needs can be provided without the need for a physical exam.  This service lets us provide the care you need with a short phone conversation.  If a prescription is necessary we can send it directly to your pharmacy.  If lab work is needed we can place an order for that and you can then stop by our lab to have the test done at a later time.    Telephone visits are billed at different rates depending on your insurance coverage. During this emergency period, for some insurers they may be billed the same as an in-person visit.  Please reach out to your insurance provider with any questions.    If during the course of the call the physician/provider feels a telephone visit is not appropriate, you will not be charged for this service.\"    Patient has given verbal consent for Telephone visit?  Yes    CLINICAL NUTRITION SERVICES - ASSESSMENT NOTE    Ramonita Trujillo 30 year old referred for MNT related to MS    Time Spent: 45 minutes  Visit Type: telephone  Referring Physician: Corina Lehman CNP  Pt accompanied by: self    NUTRITION HISTORY  Factors affecting nutrition intake include: none at this time  Current diet: lacto-ovo vegetarian  Current appetite/intake: kylee Shipman presents today with desire to learn about food choices with MS.   She understands that certain foods can be inflammatory and anti-inflammatory.    She has recently switched to a vegetarian diet, only eating dairy.    She has not eaten red meat in over a year as she does not like it.   She eats a lot of greens and fruits.  She has limited coffee to < 1 cup/week.   She dines out/fast food occasionally at [facility] or other locations.    Diet Recall  Breakfast Fruit " "- berries, banana, almonds, +/- yogurt   Lunch Salad greens or Henri Johns    Dinner Cooked greens, turnips, chick peas   Snacks Cheese, fruit or yogurt   Beverages Water     ANTHROPOMETRICS  Height: 61\"  Weight: 131 lb/59kg  BMI: 24  Weight Status:  Normal BMI  IBW: 105 lb (124%)  Weight History:   Wt Readings from Last 5 Encounters:   09/29/20 59.7 kg (131 lb 9.6 oz)   09/22/20 56.7 kg (125 lb)   08/04/20 56.2 kg (124 lb)   03/21/14 56.7 kg (125 lb)   10/07/13 57.6 kg (127 lb)     Dosing Weight: 50kg    Medications/vitamins/minerals/herbals:   Reviewed    Labs:   Labs reviewed    NUTRITION FOCUSED PHYSICAL ASSESSMENT FOR DIAGNOSING MALNUTRITION:  Consult for education only    ASSESSED NUTRITION NEEDS:  Estimated Energy Needs: 4133-5640 kcals (25-30 Kcal/Kg)  Justification: maintenance  Estimated Protein Needs: 60 grams protein (1-1.2 g pro/Kg)  Justification: maintenance  Estimated Fluid Needs: 1500  mL   Justification: maintenance    NUTRITION DIAGNOSIS:  Food and nutrition-related knowledge deficit related to food choices with MS as evidenced by pt with questions regarding optimal food choices to prevent imflammation.     INTERVENTIONS  Provided written & verbal education:   - Discussed anti-inflammatory and inflammatory food choices. Reviewed and encouraged Mediterranean diet guidelines. Encouraged fruits, vegetables, whole grains, flax, bran, omega 3 fish oil and yogurt.   - Vegetarian diet - reviewed ways to complement proteins if she chooses vegan.   - Advised pt to aim for at least 1500kcal and 60g protein daily.   - Suggested she start taking omega 3 fish oil and Kefir (Probiotic). She will take a probiotic upon her inquiry for gut health.   Pt verbalize understanding of materials provided during consult.   Patient Understanding: Excellent  Expected patient engagement: Excellent     Follow-Up Plans: Pt has RD contact information for questions.      MONITORING AND EVALUATION:  -Food/beverage intake  -Weight " trends    Iraida Glass RD, LD

## 2020-12-01 ENCOUNTER — HOSPITAL ENCOUNTER (OUTPATIENT)
Dept: PHYSICAL THERAPY | Facility: CLINIC | Age: 30
Setting detail: THERAPIES SERIES
End: 2020-12-01
Attending: PSYCHIATRY & NEUROLOGY
Payer: COMMERCIAL

## 2020-12-01 PROCEDURE — 97110 THERAPEUTIC EXERCISES: CPT | Mod: GP,GT | Performed by: PHYSICAL THERAPIST

## 2020-12-01 PROCEDURE — 97530 THERAPEUTIC ACTIVITIES: CPT | Mod: GP,GT | Performed by: PHYSICAL THERAPIST

## 2020-12-02 NOTE — PROGRESS NOTES
Ramonita Trujillo is a 30 year old female who is being seen via a billable video visit.      Patient has given verbal consent for Video visit? Yes    Video Start Time: 224    Telehealth Visit Details    Type of Service:  Telehealth    Video End Time (time video stopped): 248    Originating Location (pt. location): Home    Additional Participants in Telehealth Visit: none    Distant Location (provider location):  [facility]     Mode of Communication (Audio Visual or Audio Only):  both    Enriqueta Willingham, PT  December 2, 2020

## 2020-12-16 ENCOUNTER — HOSPITAL ENCOUNTER (OUTPATIENT)
Dept: PHYSICAL THERAPY | Facility: CLINIC | Age: 30
Setting detail: THERAPIES SERIES
End: 2020-12-16
Attending: PSYCHIATRY & NEUROLOGY
Payer: COMMERCIAL

## 2020-12-16 PROCEDURE — 97110 THERAPEUTIC EXERCISES: CPT | Mod: GP | Performed by: PHYSICAL THERAPIST

## 2020-12-16 PROCEDURE — 97112 NEUROMUSCULAR REEDUCATION: CPT | Mod: GP | Performed by: PHYSICAL THERAPIST

## 2020-12-16 PROCEDURE — 97530 THERAPEUTIC ACTIVITIES: CPT | Mod: GP | Performed by: PHYSICAL THERAPIST

## 2020-12-16 NOTE — IP AVS SNAPSHOT
MRN:[id number]                      After Visit Summary   12/16/2020    Ramonita Trujillo    MRN: [id number]           Visit Information        Provider Department      12/16/2020  9:45 AM Enriqueta Willingham PT [facility]        Your next 10 appointments already scheduled    Jan 11, 2021  8:00 AM  MR CERVICAL SPINE W/O & W CONTRAST with BEMR1  [facility] [place] ([facility]) [address]  [place] [id number] [phone]   How do I prepare for my exam? (Food and drink instructions)  **If you will be receiving sedation or general anesthesia, please see special notes below.**    How do I prepare for my exam? (Other instructions)  Take your medicines as usual, unless your doctor tells you not to.  You may or may not receive intravenous (IV) contrast for this exam pending the discretion of the Radiologist.  You do not need to do anything special to prepare.    **If you will be receiving sedation or general anesthesia, please see special notes below.**    What should I wear: The MRI machine uses a strong magnet. Due to increased risk of metallic materials/threading in clothing, for your safety, you will be requested to change into a hospital gown. Please remove any body piercings and hair extensions before you arrive. You will also remove watches, jewelry, hairpins, wallets, dentures, partial dental plates and hearing aids. You may wear contact lenses, and you may be able to wear your rings. We have a safe place to keep your personal items, but it is safer to leave them at home.    How long does the exam take: Most tests take 30 to 60 minutes.  HOWEVER, IF YOUR DOCTOR PRESCRIBES ANESTHESIA please plan on spending four to five hours in the recovery room.    What should I bring:  Bring a list of your current medicines to your exam (including vitamins, minerals and over-the-counter drugs).  If you are a minor (under age 18) you will  need to bring a parent or legal guardian with you to the exam.    Do I need a :  **If you will be receiving sedation or general anesthesia, please see special notes below.**    What should I do after the exam: No restrictions, you may resume normal activities.    What is this test: MRI (magnetic resonance imaging) uses a strong magnet and radio waves to look inside the body. An MRA (magnetic resonance angiogram) does the same thing, but it lets us look at your blood vessels. A computer turns the radio waves into pictures showing cross sections of the body, much like slices of bread. This helps us see any problems more clearly. You may receive fluid (called  contrast ) before or during your scan. The fluid helps us see the pictures better. We give the fluid through an IV (small needle in your arm).    Who should I call with questions:  Please call the Imaging Department at your exam site with any questions. Directions, parking instructions, and other information are available on our website, "[employer].Rad/imaging.    How do I prepare if I m having sedation or anesthesia?  **IMPORTANT**  THE INSTRUCTIONS BELOW ARE ONLY FOR THOSE PATIENTS WHO HAVE BEEN TOLD THEY WILL RECEIVE SEDATION OR GENERAL ANESTHESIA DURING THEIR MRI PROCEDURE:    IF YOU WILL RECEIVE ORAL SEDATION (take medicine by mouth to help you relax during your exam):  You must get the medicine from your doctor before you arrive. Bring the medicine to the exam. Do not take it at home.  Arrive one hour early with a . Your  must remain on site for the duration of the exam. Your medicine may make you sleepy. After the exam, you may not drive, take a bus or take a taxi by yourself.    IF YOU WILL RECEIVE SEDATION WITH AN IV OR ANESTHESIA (deeper level of sedation ordered and administered by a health professional):  Arrive 1 1/2 hours early with a . Your  must remain on site for the duration of the exam. Your medicine may make you  sleepy. After the exam, you may not drive, take a bus or take a taxi by yourself.  No eating 8 hours before your exam. You may have clear liquids up until 4 hours before your exam. (Clear liquids include water, clear tea, black coffee and fruit juice without pulp.)  You may spend up to four to five hours in the recovery room.     Jan 11, 2021 12:30 PM  Telephone Visit with MIREILLE Yadav  [facility] ([facility]) [address] [phone]  [phone]   [facility]  Note: this is not an onsite visit; there is no need to come to the facility.  Please have a list of all current medications available for appointment.      Jan 14, 2021  2:00 PM  Return Visit with Earl Pritchett MD  [facility] ([facility]) [address] [place] [phone]           Further instructions from your care team       12/16/20    Great job.     CALL OR email me if ?'s/ updates    65 cm ball.       Good shoes.      Arcadio Robison  [phone]  [email or web address]    Care EveryWhere ID    This is your Care EveryWhere ID. This could be used by other organizations to access your [place] medical records  [id number]       Equal Access to Services    MARLEN BHAKTA AH: Hadii larry novako Sobjali, waaxda luqadaha, qaybta kaalmada adeegyada, sally valenzuela. So [facility] [phone].    ATENCIÓN: Si habla español, tiene a arriaga disposición servicios gratuitos de asistencia lingüística. Neftaly al [phone].    We comply with applicable federal and state civil rights laws, including the [place] Human Rights Act. We do not discriminate on the basis of race, color, creed, Tenriism, national origin, marital status, age, disability, sex, sexual orientation, or gender  identity.

## 2020-12-16 NOTE — DISCHARGE INSTRUCTIONS
12/16/20    Great job.     CALL OR email me if ?'s/ updates    65 cm ball.       Good shoes.      Good job- Enriqueta  [phone]  [email or web address]

## 2020-12-18 NOTE — PROGRESS NOTES
12/16/20 0900   Signing Clinician's Name / Credentials   Signing clinician's name / credentials Enriqueta Maulik PT   Session Number   Session Number 5 [facility]are   Goal 1   Goal Identifier gait   Goal Description FGA to improve to 25 or more on testing for improved gait   Target Date 01/18/21   Date Met 12/16/20   Goal 2   Goal Identifier HEP   Goal Description pt to be indep w/ a HEP for stretching , strengthening and aerobic exer   Target Date 01/18/21   Date Met 12/16/20   Goal 3   Goal Identifier falls prev   Goal Description pt to verbalize 3+falls prevention ideas for safety.   Target Date 01/18/21   Date Met 12/16/20   Subjective Report   Subjective Report coffee drinking --some.  not every day.  walking is much better.  work good. main job closed over holidays.  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  no other plans.  too cold to walk outside.   Objective Measure 1   Objective Measure FGA   Details 28/30   Objective Measure 2   Objective Measure 25ft walk   Details 6.4 sec   Objective Measure 3   Objective Measure 30 sec STS   Details 15 x   Objective Measure 5   Objective Measure Tug   Details 10 sec   Therapeutic Procedure/exercise   Therapeutic Procedures: strength, endurance, ROM, flexibillity minutes (63706) 12   Skilled Intervention ball exere   Patient Response marky well   Treatment Detail ball exer seated, supine, prone on ball all w/ cues for moving ball under her in sitting, one extrem ext x 5 x 4 limbs in prone and 3 exer as done in past in supine.  marky well   Therapeutic Activity   Therapeutic Activities: dynamic activities to improve functional performance minutes (80695) 10   Skilled Intervention educ re; pacing, walking halls of building in cold weather.  good shoes and needed support, tied shoes to top eyelet today   Patient Response agrees   Treatment Detail education re; pacing, activity, walk halls in apt if weather not good (she does not like winter cold to walk outside)  energy mgt   Progress see above.     Neuromuscular Re-education   Neuromuscular re-ed of mvmt, balance, coord, kinesthetic sense, posture, proprioception minutes (58091) 14   Skilled Intervention tests above;see improvements   Patient Response marky well   Treatment Detail see above.   balance sls x 5 to 6 sec bilat.  gait improved. rec cont HEP   Education   Learner Patient   Readiness Eager;Acceptance   Method Booklet/handout;Explanation   Response Demonstrates Understanding;Verbalizes Understanding   Education Comments HEP/ d/c for now   Plan   Homework above   Home program skc, 4 point arch sag and alt ext, semi tandem stand.  half  and full planks, pelvic tilt and curl ups supine   Updates to plan of care goals met   Plan for next session d/c w/ home programe   Total Session Time   Timed Code Treatment Minutes 36   Total Treatment Time (sum of timed and untimed services) 36

## 2020-12-18 NOTE — PROGRESS NOTES
Outpatient Physical Therapy Discharge Note     Patient: Ramonita Trujillo  : 1990    Beginning/End Dates of Reporting Period:  10/21/20 to 2020    Referring Provider: HANK Pritchett MD    Therapy Diagnosis: MS ataxia     Client Self Report: coffee drinking --some.  not every day.  walking is much better.  work good. main job closed over holidays.  pca job work .  no other plans.  too cold to walk outside.    Objective Measurements:  Objective Measure: FGA  Details:   Objective Measure: 25ft walk  Details: 6.4 sec  Objective Measure: 30 sec STS  Details: 15 x  Objective Measure: Tug  Details: 10 sec    Goals:  Goal Identifier gait   Goal Description FGA to improve to 25 or more on testing for improved gait   Target Date 21   Date Met  20   Progress:     Goal Identifier HEP   Goal Description pt to be indep w/ a HEP for stretching , strengthening and aerobic exer   Target Date 21   Date Met  20   Progress:     Goal Identifier falls prev   Goal Description pt to verbalize 3+falls prevention ideas for safety.   Target Date 21   Date Met  20   Progress:         Progress Toward Goals:   Progress this reporting period: all goals met    Plan:  Discharge from therapy.    Discharge:  yes    Reason for Discharge: Patient has met all goals.    Equipment Issued: none    Discharge Plan: Patient to continue home program.

## 2021-01-13 ENCOUNTER — ANCILLARY PROCEDURE (OUTPATIENT)
Dept: MRI IMAGING | Facility: CLINIC | Age: 31
End: 2021-01-13
Attending: PSYCHIATRY & NEUROLOGY
Payer: COMMERCIAL

## 2021-01-13 DIAGNOSIS — G35 MULTIPLE SCLEROSIS (H): ICD-10-CM

## 2021-01-13 PROCEDURE — 72156 MRI NECK SPINE W/O & W/DYE: CPT | Mod: TC | Performed by: RADIOLOGY

## 2021-01-13 PROCEDURE — A9585 GADOBUTROL INJECTION: HCPCS | Mod: JW | Performed by: RADIOLOGY

## 2021-01-13 PROCEDURE — 70553 MRI BRAIN STEM W/O & W/DYE: CPT | Mod: TC | Performed by: RADIOLOGY

## 2021-01-13 RX ORDER — GADOBUTROL 604.72 MG/ML
7.5 INJECTION INTRAVENOUS ONCE
Status: COMPLETED | OUTPATIENT
Start: 2021-01-13 | End: 2021-01-13

## 2021-01-13 RX ADMIN — GADOBUTROL 6 ML: 604.72 INJECTION INTRAVENOUS at 10:32

## 2021-01-15 NOTE — PATIENT INSTRUCTIONS
AFTER VISIT SUMMARY (AVS):    At today's visit we thoroughly discussed current symptoms, evaluation results, treatment options, and the plan.    I would like to repeat your vitamin D level and adjust your vitamin D dose if necessary.    We decided to continue monitoring your symptoms without treatment.  I highly encourage you to consider starting disease modifying therapy for multiple sclerosis.    We will plan to repeat your brain and cervical spine MRI's with and without contrast in approximately 12 months from now.  However, if you experience any new neurological symptoms, please contact my clinic right away.    Next follow-up appointment is in the next 6 months or earlier if needed.    Please do not hesitate to call me with any questions or concerns.    Thanks.

## 2021-01-15 NOTE — PROGRESS NOTES
ESTABLISHED PATIENT NEUROLOGY NOTE    DATE OF VISIT: 1/18/2021  CLINIC LOCATION: [facility]  MRN: [id number]  PATIENT NAME: Ramonita Trujillo  YOB: 1990    PCP: BRENT Neri CNP    REASON FOR VISIT:   Chief Complaint   Patient presents with     Neurologic Problem     MRI F/U      SUBJECTIVE:                                                      HISTORY OF PRESENT ILLNESS: Patient is here to follow up regarding multiple sclerosis.  The last visit was on 10/27/2020.  At that time we decided to start Copaxone, but the patient called back later stating that she is not ready to start the medication and will seek second opinion at [facility].  Please refer to my initial/other prior notes for further information.    Since the last visit, the patient reports that her symptoms are stable. She denies interval development of new focal neurological symptoms.  Feels that physical therapy was really helpful, and her gait is improved.  She also sought second opinion at [facility] (does not remember the name of the doctor).  She decided to follow-up with me after that visit.    She does not want to take Copaxone or any other disease modifying therapy at the present time.  She is on 2000 international units of Vitamin D daily, occasionally she takes 4000 units per day.  Her vitamin D level was not rechecked since September 2020, when it was 19.    Cervical spine MRI with and without contrast from 1/13/2021 demonstrated subtle signal abnormality at the dorsal spinal cord at C2 and C4-5, felt to be related to demyelination and not significantly changed compared to imaging study from September 2020.  No definite contrast-enhancement to suggest active demyelination.  Mild degenerative disc changes in the cervical spine are also noted.    Brain MRI with and without contrast from the same date demonstrated multiple abnormal white matter lesions compatible  with provided history of demyelination without significant changes or contrast enhancement to suggest active demyelination.    Both studies were independently reviewed and interpreted.    On review of systems, patient endorses no additional active complaints. Medications, allergies, family and social history were also reviewed. There are no changes reported by patient.  REVIEW OF SYSTEMS:                                                    10-system review was completed. Pertinent positives are included in HPI. The remainder of ROS is negative.  EXAM:                                                    Physical Exam:   Vitals: /67 (BP Location: Left arm, Patient Position: Sitting, Cuff Size: Adult Regular)   Pulse 86   Temp 98.4  F (36.9  C) (Oral)   Ht 5' (1.524 m)   Wt 132 lb 6.4 oz (60.1 kg)   SpO2 98%   BMI 25.86 kg/m      General: pt is in NAD, cooperative.  Skin: normal turgor, moist mucous membranes, no lesions/rashes noticed.  HEENT: ATNC, white sclera, normal conjunctiva.  Respiratory: Symmetric lung excursion, no accessory respiratory muscle use.  Abdomen: Non distended.  Neurological: awake, cooperative, follows commands, cranial nerves are intact bilaterally, strength is mildly reduced in both lower extremities, deep tendon reflexes of both lower extremities are brisk with 2-3 beats of ankle clonus bilaterally, toes are equivocal bilaterally, light touch and pinprick sensation is equal bilaterally.  Gait is mildly unsteady, though improved compared to the initial visit.  ASSESSMENT AND PLAN:                                                    Assessment: 30-year-old female patient with presumed multiple sclerosis presents for a follow-up.  At the last visit, we planned to initiate Copaxone, but the patient called later informing that she decided against starting it and plans to obtain second opinion at [facility] (records were requested).  She decided to follow up with me after  her initial visit there.    Today, we had a thorough discussion with the patient regarding her symptoms, brain and cervical spine MRI results, available treatment options, and the plan.  We reviewed her images together.  I would like to repeat her brain and cervical spine MRI with and without contrast in approximately 12 months from now unless she has new neurological symptoms. I would also like to repeat her vitamin D level now and adjust her replacement as necessary.      I discussed with the patient that even though her neurological exam and imaging studies look stable today, I suspect that she might have future relapses that could lead to progressive disability.  We had an extensive discussion with the patient regarding disease time course, available treatment options, and the plan.  I encouraged the patient to highly consider starting disease modifying therapy, but the patient was still hesitant to proceed.    Diagnoses:    ICD-10-CM    1. MS (multiple sclerosis) (H)  G35      Plan: At today's visit we thoroughly discussed current symptoms, evaluation results, treatment options, and the plan.    I would like to repeat her vitamin D level and adjust her vitamin D dose if necessary.    We decided to continue monitoring her symptoms without treatment.  I highly encouraged her to consider starting disease modifying therapy for multiple sclerosis.    We will plan to repeat her brain and cervical spine MRI's with and without contrast in approximately 12 months from now.  However, she was advised to contact my clinic right away if she experiences any new neurological symptoms.    Next follow-up appointment is in the next 6 months or earlier if needed.    Total Time: 33 minutes spent on the date of the encounter doing chart review, review of test results, interpretation of tests, patient visit and documentation.    Earl Pritchett MD  [facility]  (Chart documentation was completed in part with  Dragon voice-recognition software. Even though reviewed, some grammatical, spelling, and word errors may remain.)

## 2021-01-18 ENCOUNTER — OFFICE VISIT (OUTPATIENT)
Dept: NEUROSURGERY | Facility: CLINIC | Age: 31
End: 2021-01-18
Attending: PSYCHIATRY & NEUROLOGY
Payer: COMMERCIAL

## 2021-01-18 VITALS
WEIGHT: 132.4 LBS | TEMPERATURE: 98.4 F | DIASTOLIC BLOOD PRESSURE: 67 MMHG | SYSTOLIC BLOOD PRESSURE: 102 MMHG | BODY MASS INDEX: 26 KG/M2 | OXYGEN SATURATION: 98 % | HEART RATE: 86 BPM | HEIGHT: 60 IN

## 2021-01-18 DIAGNOSIS — G35 MS (MULTIPLE SCLEROSIS) (H): Primary | ICD-10-CM

## 2021-01-18 PROCEDURE — 99214 OFFICE O/P EST MOD 30 MIN: CPT | Performed by: PSYCHIATRY & NEUROLOGY

## 2021-01-18 ASSESSMENT — MIFFLIN-ST. JEOR: SCORE: 1247.06

## 2021-01-18 NOTE — LETTER
1/18/2021         RE: Ramonita Trujillo  [address]  [address]  [place] [address]        Dear Colleague,    Thank you for referring your patient, Ramonita Trujillo, to the [facility] [place]. Please see a copy of my visit note below.    ESTABLISHED PATIENT NEUROLOGY NOTE    DATE OF VISIT: 1/18/2021  CLINIC LOCATION: [facility]  MRN: [id number]  PATIENT NAME: Ramonita Trujillo  YOB: 1990    PCP: BRENT Neri CNP    REASON FOR VISIT:   Chief Complaint   Patient presents with     Neurologic Problem     MRI F/U      SUBJECTIVE:                                                      HISTORY OF PRESENT ILLNESS: Patient is here to follow up regarding multiple sclerosis.  The last visit was on 10/27/2020.  At that time we decided to start Copaxone, but the patient called back later stating that she is not ready to start the medication and will seek second opinion at [facility].  Please refer to my initial/other prior notes for further information.    Since the last visit, the patient reports that her symptoms are stable. She denies interval development of new focal neurological symptoms.  Feels that physical therapy was really helpful, and her gait is improved.  She also sought second opinion at [facility] (does not remember the name of the doctor).  She decided to follow-up with me after that visit.    She does not want to take Copaxone or any other disease modifying therapy at the present time.  She is on 2000 international units of Vitamin D daily, occasionally she takes 4000 units per day.  Her vitamin D level was not rechecked since September 2020, when it was 19.    Cervical spine MRI with and without contrast from 1/13/2021 demonstrated subtle signal abnormality at the dorsal spinal cord at C2 and C4-5, felt to be related to demyelination and not significantly changed compared to imaging study  from September 2020.  No definite contrast-enhancement to suggest active demyelination.  Mild degenerative disc changes in the cervical spine are also noted.    Brain MRI with and without contrast from the same date demonstrated multiple abnormal white matter lesions compatible with provided history of demyelination without significant changes or contrast enhancement to suggest active demyelination.    Both studies were independently reviewed and interpreted.    On review of systems, patient endorses no additional active complaints. Medications, allergies, family and social history were also reviewed. There are no changes reported by patient.  REVIEW OF SYSTEMS:                                                    10-system review was completed. Pertinent positives are included in HPI. The remainder of ROS is negative.  EXAM:                                                    Physical Exam:   Vitals: /67 (BP Location: Left arm, Patient Position: Sitting, Cuff Size: Adult Regular)   Pulse 86   Temp 98.4  F (36.9  C) (Oral)   Ht 5' (1.524 m)   Wt 132 lb 6.4 oz (60.1 kg)   SpO2 98%   BMI 25.86 kg/m      General: pt is in NAD, cooperative.  Skin: normal turgor, moist mucous membranes, no lesions/rashes noticed.  HEENT: ATNC, white sclera, normal conjunctiva.  Respiratory: Symmetric lung excursion, no accessory respiratory muscle use.  Abdomen: Non distended.  Neurological: awake, cooperative, follows commands, cranial nerves are intact bilaterally, strength is mildly reduced in both lower extremities, deep tendon reflexes of both lower extremities are brisk with 2-3 beats of ankle clonus bilaterally, toes are equivocal bilaterally, light touch and pinprick sensation is equal bilaterally.  Gait is mildly unsteady, though improved compared to the initial visit.  ASSESSMENT AND PLAN:                                                    Assessment: 30-year-old female patient with presumed multiple sclerosis presents  for a follow-up.  At the last visit, we planned to initiate Copaxone, but the patient called later informing that she decided against starting it and plans to obtain second opinion at [facility] (records were requested).  She decided to follow up with me after her initial visit there.    Today, we had a thorough discussion with the patient regarding her symptoms, brain and cervical spine MRI results, available treatment options, and the plan.  We reviewed her images together.  I would like to repeat her brain and cervical spine MRI with and without contrast in approximately 12 months from now unless she has new neurological symptoms. I would also like to repeat her vitamin D level now and adjust her replacement as necessary.      I discussed with the patient that even though her neurological exam and imaging studies look stable today, I suspect that she might have future relapses that could lead to progressive disability.  We had an extensive discussion with the patient regarding disease time course, available treatment options, and the plan.  I encouraged the patient to highly consider starting disease modifying therapy, but the patient was still hesitant to proceed.    Diagnoses:    ICD-10-CM    1. MS (multiple sclerosis) (H)  G35      Plan: At today's visit we thoroughly discussed current symptoms, evaluation results, treatment options, and the plan.    I would like to repeat her vitamin D level and adjust her vitamin D dose if necessary.    We decided to continue monitoring her symptoms without treatment.  I highly encouraged her to consider starting disease modifying therapy for multiple sclerosis.    We will plan to repeat her brain and cervical spine MRI's with and without contrast in approximately 12 months from now.  However, she was advised to contact my clinic right away if she experiences any new neurological symptoms.    Next follow-up appointment is in the next 6 months or earlier if  needed.    Total Time: 33 minutes spent on the date of the encounter doing chart review, review of test results, interpretation of tests, patient visit and documentation.    Earl Pritchett MD  [facility]  (Chart documentation was completed in part with Dragon voice-recognition software. Even though reviewed, some grammatical, spelling, and word errors may remain.)      Again, thank you for allowing me to participate in the care of your patient.        Sincerely,        Earl Pritchett MD

## 2021-01-18 NOTE — NURSING NOTE
Ramonita Trujillo is a 30 year old female who presents for:  Chief Complaint   Patient presents with     Neurologic Problem     MRI F/U         Initial Vitals:  /67 (BP Location: Left arm, Patient Position: Sitting, Cuff Size: Adult Regular)   Pulse 86   Temp 98.4  F (36.9  C) (Oral)   Ht 5' (1.524 m)   Wt 132 lb 6.4 oz (60.1 kg)   SpO2 98%   BMI 25.86 kg/m   Estimated body mass index is 25.86 kg/m  as calculated from the following:    Height as of this encounter: 5' (1.524 m).    Weight as of this encounter: 132 lb 6.4 oz (60.1 kg).. Body surface area is 1.6 meters squared. BP completed using cuff size: regular  Data Unavailable    Nursing Comments: see chief complaint      Natalio Hollingsworth, CMA

## 2021-10-20 ENCOUNTER — VIRTUAL VISIT (OUTPATIENT)
Dept: FAMILY MEDICINE | Facility: CLINIC | Age: 31
End: 2021-10-20
Payer: COMMERCIAL

## 2021-10-20 ENCOUNTER — LAB (OUTPATIENT)
Dept: LAB | Facility: CLINIC | Age: 31
End: 2021-10-20
Payer: COMMERCIAL

## 2021-10-20 DIAGNOSIS — R23.3 BRUISES EASILY: ICD-10-CM

## 2021-10-20 DIAGNOSIS — Z11.59 NEED FOR HEPATITIS C SCREENING TEST: Primary | ICD-10-CM

## 2021-10-20 DIAGNOSIS — G35 MS (MULTIPLE SCLEROSIS) (H): Primary | ICD-10-CM

## 2021-10-20 LAB
BASOPHILS # BLD AUTO: 0 10E3/UL (ref 0–0.2)
BASOPHILS NFR BLD AUTO: 0 %
EOSINOPHIL # BLD AUTO: 0.1 10E3/UL (ref 0–0.7)
EOSINOPHIL NFR BLD AUTO: 1 %
ERYTHROCYTE [DISTWIDTH] IN BLOOD BY AUTOMATED COUNT: 12.5 % (ref 10–15)
HCT VFR BLD AUTO: 36.6 % (ref 35–47)
HGB BLD-MCNC: 12.6 G/DL (ref 11.7–15.7)
LYMPHOCYTES # BLD AUTO: 1.9 10E3/UL (ref 0.8–5.3)
LYMPHOCYTES NFR BLD AUTO: 17 %
MCH RBC QN AUTO: 30.9 PG (ref 26.5–33)
MCHC RBC AUTO-ENTMCNC: 34.4 G/DL (ref 31.5–36.5)
MCV RBC AUTO: 90 FL (ref 78–100)
MONOCYTES # BLD AUTO: 0.8 10E3/UL (ref 0–1.3)
MONOCYTES NFR BLD AUTO: 7 %
NEUTROPHILS # BLD AUTO: 8 10E3/UL (ref 1.6–8.3)
NEUTROPHILS NFR BLD AUTO: 74 %
PLATELET # BLD AUTO: 231 10E3/UL (ref 150–450)
RBC # BLD AUTO: 4.08 10E6/UL (ref 3.8–5.2)
WBC # BLD AUTO: 10.9 10E3/UL (ref 4–11)

## 2021-10-20 PROCEDURE — 36415 COLL VENOUS BLD VENIPUNCTURE: CPT

## 2021-10-20 PROCEDURE — 86803 HEPATITIS C AB TEST: CPT

## 2021-10-20 PROCEDURE — 99214 OFFICE O/P EST MOD 30 MIN: CPT | Mod: 95 | Performed by: NURSE PRACTITIONER

## 2021-10-20 PROCEDURE — 80050 GENERAL HEALTH PANEL: CPT

## 2021-10-20 NOTE — PROGRESS NOTES
"Ramonita is a 31 year old who is being evaluated via a billable video visit.      How would you like to obtain your AVS? MyChart  If the video visit is dropped, the invitation should be resent by: Text to cell phone: [phone]  Will anyone else be joining your video visit? No    Video Start Time: 8:41 AM  This was converted to telephone encounter as patient experienced connectivity issues.  Provider did not examine patient's legs.        ICD-10-CM    1. MS (multiple sclerosis) (H)  G35 REVIEW OF HEALTH MAINTENANCE PROTOCOL ORDERS   2. Bruises easily  R23.8 TSH with free T4 reflex     CBC with platelets and differential     Comprehensive metabolic panel (BMP + Alb, Alk Phos, ALT, AST, Total. Bili, TP)     This encounter was a telephone visit patient experienced connectivity issues over video.  Patient was not physically examined by PCP.  Reports red bruises on lower extremities bilateral up to knee for last 4 months with occasional night sweats in the absence of fatigue (in the setting of MS), unintentional weight loss, generalized pain or bleeding.    This needs further work-up.  Recommend patient to go into clinic today to have lab work done.  Will call patient with plan based on results.      Interval history   Patient diagnosed with MS in the last year has been seen by neurology hesitant to start disease modifying treatment though this is strongly recommended by PCP and neurologist. Says that her best treatment is \"nature\".     She started to develop random bruising on her legs in the summer. Both legs, red, tender to touch. Red small lesions from ankle up to the knee. When pt applies pressure it blanches.   No unintentional weight, no fatigue, no bleeding, no decrease in appetite, does endorse night sweats but does sleep with space heater and fan on, has back pain but nothing out of the normal. No hair or nail changes.  No lymphadenopathy.   Normal periods. Not sexually active currently. LMP- " 09/25/2021      Sun Shipman is a 31 year old who presents for the following health issues     HPI     Please use DOXIMITY         Review of Systems   Constitutional, HEENT, cardiovascular, pulmonary, GI, , musculoskeletal, neuro, skin, endocrine and psych systems are negative, except as otherwise noted.      Objective           Vitals:  No vitals were obtained today due to virtual visit.    Physical Exam   Had to convert to telephone encounter.     Office Visit on 09/29/2020   Component Date Value Ref Range Status     Vitamin D Deficiency screening 09/29/2020 19* 20 - 75 ug/L Final    Comment: Season, race, dietary intake, and treatment affect the concentration of   25-hydroxy-Vitamin D. Values may decrease during winter months and increase   during summer months. Values 20-29 ug/L may indicate Vitamin D insufficiency   and values <20 ug/L may indicate Vitamin D deficiency.  Vitamin D determination is routinely performed by an immunoassay specific for   25 hydroxyvitamin D3.  If an individual is on vitamin D2 (ergocalciferol)   supplementation, please specify 25 OH vitamin D2 and D3 level determination by   LCMSMS test VITD23.       Neuromyelitis Optica AQP4 IgG Blood 09/29/2020 <1:10  <1:10 Final    Comment: (Note)  Aquaporin-4 Receptor Antibody, IgG is not detected. No   further testing will be performed.  INTERPRETIVE INFORMATION: Neuromyelitis Optica/AQP4-IgG                            w/Rfx, Ser  Diagnosis of neuromyelitis optica (NMO) requires the   presence of longitudinally extensive acute myelitis   (lesions extending over 3 or more vertebral segments) and   optic neuritis. Approximately 75 percent of patients with   NMO express antibodies to the aquaporin-4 (AQP4) receptor.   While the absence of AQP4 receptor antibodies does not rule   out a diagnosis of NMO, presence of this antibody is   diagnostic for NMO.  Test developed and characteristics determined by [facility]. See Compliance  Statement D: [facility]/  Performed By: [facility]  [address] [place] [id number]  : Vero Dumont MD       Angiotensin Converting Enzyme 09/29/2020 30  9 - 67 U/L Final    Comment: (Note)  Performed By: [facility]  [address] [place], [place] [id number]  : Vero Dumont MD       HTLV I/II Antibodies 09/29/2020 Negative  Negative Final    Comment: (Note)  Based on the non-reactive anti-HTLV NEVAEH screen, the HTLV   Western Blot is not indicated and therefore not performed.  INTERPRETIVE INFORMATION:  HTLV I/II Antibodies w/Reflex                             to Confirm  This assay should not be used for blood donor screening,   associated re-entry protocols, or for screening Human Cell,   Tissues and Cellular and Tissue-Based Products (HCT/P).  Performed by [facility],  [address] [place], [place] [id number] [phone]  [email or web address], Vero Dumont MD, Lab. Director       Lyme Disease Antibodies Serum 09/29/2020 0.05  0.00 - 0.89 Final    Comment: Negative, Absence of detectable Borrelia burdorferi antibodies. A negative   result does not exclude the possibility of Borrelia burgdorferi infection. If   early Lyme disease is suspected, a second sample should be collected and   tested 2 to 4 weeks later.       TSH 09/29/2020 1.03  0.40 - 4.00 mU/L Final     HIV Antigen Antibody Combo 09/29/2020 Nonreactive  NR^Nonreactive     Final    HIV-1 p24 Ag & HIV-1/HIV-2 Ab Not Detected     MTB Quantiferon Result 09/29/2020 Negative  NEG^Negative Final    Comment: No interferon gamma response to M.tuberculosis antigens was detected.   Infection with M.tuberculosis is unlikely, however a single negative result   does not exclude infection. In patients at high risk for infection, a second   test should be considered       TB1 Ag minus Nil 09/29/2020 0.03  IU/mL Final     TB2 Ag minus Nil 09/29/2020 0.03  IU/mL Final     Mitogen minus Nil 09/29/2020 9.98   IU/mL Final     NIL Result 09/29/2020 0.02  IU/mL Final     PNP Antibody 09/29/2020 SEE NOTE 10/08/2020 07:18 AM   Final    Comment: (Note)  Test                               Result   Flag  Unit    RefValue  ------------------------------------------------------------------  Paraneoplastic Autoantibody CARTER Casarez   Interpretive Comments            SEE NOTE                            No informative autoantibodies were detected in the      Paraneoplastic Evaluation. However, a negative result does      not exclude neurological autoimmunity with or without      associated neoplasia. Sensitivity and specificity of      antibody testing are enhanced by testing both serum and      CSF.   AChR Ganglionic Neuronal Ab, S   0.00           nmol/L  <=0.02       This test was developed and its performance characteristics      determined by [facility] in a manner consistent with CLIA      requirements. This test has not been cleared or approved by      the U.S. Food and Drug Administration.   Amphiphysin Ab, S                Negative       titer   <1:240       This test was developed and its performance characteristics      determ                           ined by [facility] in a manner consistent with CLIA      requirements. This test has not been cleared or approved by      the U.S. Food and Drug Administration.   AGNA-1, S                        Negative       titer   <1:240       This test was developed and its performance characteristics      determined by [facility] in a manner consistent with CLIA      requirements. This test has not been cleared or approved by      the U.S. Food and Drug Administration.   ELANA-1, S                        Negative       titer   <1:240     Reflex Added                     None.                               This test was developed and its performance characteristics      determined by [facility] in a manner consistent with CLIA      requirements. This test has not been cleared or approved  by      the U.S. Food and Drug Administration.   ELANA-2, S                        Negative       titer   <1:240       This test was developed and its performance characteristics      determined by [facility] i                           n a manner consistent with CLIA      requirements. This test has not been cleared or approved by      the U.S. Food and Drug Administration.   ELANA-3, S                        Negative       titer   <1:240       This test was developed and its performance characteristics      determined by [facility] in a manner consistent with CLIA      requirements. This test has not been cleared or approved by      the U.S. Food and Drug Administration.   CRMP-5-IgG, S                    Negative       titer   <1:240       This test was developed and its performance characteristics      determined by [facility] in a manner consistent with CLIA      requirements. This test has not been cleared or approved by      the U.S. Food and Drug Administration.   Neuronal (V-G) K+ Channel Ab, S  0.00           nmol/L  <=0.02       This test was developed and its performance characteristics      determined by [facility] in a manner consistent with CLIA      requirements. This test has not been cleared or jorge                           roved by      the U.S. Food and Drug Administration.   N-Type Calcium Channel Ab        0.00           nmol/L  <=0.03       This test was developed and its performance characteristics      determined by [facility] in a manner consistent with CLIA      requirements. This test has not been cleared or approved by      the U.S. Food and Drug Administration.   P/Q-Type Calcium Channel Ab      0.00           nmol/L  <=0.02       This test was developed and its performance characteristics      determined by [facility] in a manner consistent with CLIA      requirements. This test has not been cleared or approved by      the U.S. Food and Drug Administration.   PCA-1, S                          Negative       titer   <1:240       This test was developed and its performance characteristics      determined by [facility] in a manner consistent with CLIA      requirements. This test has not been cleared or approved by      the U.S. Food and Drug Administration.   PCA-2, S                                                    Negative       titer   <1:240       This test was developed and its performance characteristics      determined by [facility] in a manner consistent with CLIA      requirements. This test has not been cleared or approved by      the U.S. Food and Drug Administration.   PCA-Tr, S                        Negative       titer   <1:240       This test was developed and its performance characteristics      determined by [facility] in a manner consistent with CLIA      requirements. This test has not been cleared or approved by      the U.S. Food and Drug Administration.   Striational (Striated Muscle)    Negative       titer   <1:120     Ab, S     This test was developed and its performance characteristics      determined by [facility] in a manner consistent with CLIA      requirements. This test has not been cleared or approved by      the U.S. Food and Drug Administration.           Test Performed by:     [address], [place]                           [place] [address]     : Brown Morales M.D. Ph.D.; CLIA# [id number]       Sed Rate 09/29/2020 8  0 - 20 mm/h Final     ABBY interpretation 09/29/2020 Negative  NEG^Negative Final    Comment:                                    Reference range:  <1:40  NEGATIVE  1:40 - 1:80  BORDERLINE POSITIVE  >1:80 POSITIVE       Bilirubin Direct 09/29/2020 <0.1  0.0 - 0.2 mg/dL Final     Bilirubin Total 09/29/2020 0.2  0.2 - 1.3 mg/dL Final     Albumin 09/29/2020 3.2* 3.4 - 5.0 g/dL Final     Protein Total 09/29/2020 6.4* 6.8 - 8.8 g/dL Final     Alkaline Phosphatase 09/29/2020 46   40 - 150 U/L Final     ALT 09/29/2020 37  0 - 50 U/L Final     AST 09/29/2020 25  0 - 45 U/L Final     CRP Inflammation 09/29/2020 <2.9  0.0 - 8.0 mg/L Final               Video-Visit Details    Type of service:  Video Visit    Video End Time:Converted to telephone patient had connectivity issues    Originating Location (pt. Location): Home    Distant Location (provider location):  [facility]     Platform used for Video Visit: [facility]

## 2021-10-21 LAB — HCV AB SERPL QL IA: NONREACTIVE

## 2021-10-22 LAB
ALBUMIN SERPL-MCNC: 4.1 G/DL (ref 3.4–5)
ALP SERPL-CCNC: 52 U/L (ref 40–150)
ALT SERPL W P-5'-P-CCNC: 17 U/L (ref 0–50)
ANION GAP SERPL CALCULATED.3IONS-SCNC: 4 MMOL/L (ref 3–14)
AST SERPL W P-5'-P-CCNC: 12 U/L (ref 0–45)
BILIRUB SERPL-MCNC: 0.5 MG/DL (ref 0.2–1.3)
BUN SERPL-MCNC: 12 MG/DL (ref 7–30)
CALCIUM SERPL-MCNC: 9.1 MG/DL (ref 8.5–10.1)
CHLORIDE BLD-SCNC: 106 MMOL/L (ref 94–109)
CO2 SERPL-SCNC: 28 MMOL/L (ref 20–32)
CREAT SERPL-MCNC: 0.72 MG/DL (ref 0.52–1.04)
GFR SERPL CREATININE-BSD FRML MDRD: >90 ML/MIN/1.73M2
GLUCOSE BLD-MCNC: 73 MG/DL (ref 70–99)
POTASSIUM BLD-SCNC: 4.3 MMOL/L (ref 3.4–5.3)
PROT SERPL-MCNC: 8.1 G/DL (ref 6.8–8.8)
SODIUM SERPL-SCNC: 138 MMOL/L (ref 133–144)
TSH SERPL DL<=0.005 MIU/L-ACNC: 0.86 MU/L (ref 0.4–4)

## 2022-01-11 NOTE — TELEPHONE ENCOUNTER
Corina Larry, BRENT HIRSCH  [employer]               Please advise patient that her vitamin B12 is is almost 3 times normal limit.  Please have her stop taking her B12 supplement.  This should be rechecked in 6 to 8 weeks.      Renal function looks good, calcium is below normal recommend OTC calcium supplement. CBC normal    [facility] MEDICINE PROGRESS NOTE    Patient: Jose Masterson Today's Date: 1/10/2022   YOB: 1954 Admission Date: 1/9/2022  7:34 AM   MRN: [id number] Inpatient LOS: 1 day(s)   Room:  320/01 Hospital Day:  Hospital Day: 2       History and Subjective complaints       Interval history and Overnight events:    No issues overnight patient wishes to go home but understands he needs to wait for Infectious Disease as given his abnormalities he denies complaints like chest pain abdominal pain nausea vomiting    Patient seen and examined by me in follow up.    Hospital Course  Patients interval history reviewed/EHR notes reviewed.   Jose Masterson is a 68 year old male who presented on 1/9/2022 with complaints of Shortness of Breath         Reviewed Pertinent Histories: Medical History, Surgical History, Social History, Family History,     ROS: Pertinent systems negative except as above.    Medications: Reviewed     Scheduled Medications:    [START ON 1/11/2022] levothyroxine, 125 mcg, QAM AC  [START ON 1/11/2022] polyethylene glycol, 17 g, Daily  [START ON 1/11/2022] VANCOMYCIN - PHARMACIST MONITORED, , See Admin Instructions  gabapentin, 600 mg, TID  sodium chloride (PF), 2 mL, 2 times per day  cefepime (MAXIPIME) IVPB, 1,000 mg, 3 times per day  pantoprazole, 40 mg, Daily  heparin (porcine), 5,000 Units, 3 times per day  VANCOMYCIN - PHARMACIST MONITORED, , See Admin Instructions  vancomycin (VANCOCIN) IVPB, 1,500 mg, 2 times per day      Continuous Infusions:    • sodium chloride 0.9% infusion     • sodium chloride 0.9% infusion       PRN Medications:  sodium chloride, sodium chloride, sodium chloride, sodium chloride, sodium chloride, prochlorperazine, morphine      Physical Examination       Vital 24 Hour Range Most Recent Value   Temperature Temp  Min: 98 °F (36.7 °C)  Max: 99.2 °F (37.3 °C) 99.2 °F (37.3 °C)   Pulse Pulse  Min: 76  Max: 94 94   Respiratory Resp  Min: 12   Max: 18 18   Blood Pressure BP  Min: 135/79  Max: 172/91 (!) 153/81 (RN notified)   Pulse Oximetry SpO2  Min: 89 %  Max: 99 % 95 %   Arterial BP No data recorded     O2 O2 Flow Rate (L/min)  Av.5 L/min  Min: 6 L/min   Min taken time: 01/10/22 0352  Max: 8 L/min   Max taken time: 01/10/22 0240       Intake and Output:      Intake/Output Summary (Last 24 hours) at 1/10/2022 2057  Last data filed at 1/10/2022 1200  Gross per 24 hour   Intake 300 ml   Output --   Net 300 ml       Last Stool Occurrence:  3 (01/10/22 133)    Vital Most Recent Value First Value   Weight 67.5 kg (148 lb 14.4 oz) Weight: 67.5 kg (148 lb 14.4 oz)   Height 5' 10\" (177.8 cm) Height: 5' 10\" (177.8 cm) (obtained through chart review)   BMI   N/A     GENERAL:  Nontoxic, nondistressed.  Alert and oriented X 3  SKIN:  Warm and moist, without rashes or lesions.  HEENT: Pupils are equal and reactive to light.  No conjunctival injection or icterus.    NECK;  Trach in place  CVS:  Normal S1S2, no murmurs, rubs, or gallops  LUNGS:  Breathing is nonlabored, no crackles, no wheezing  ABD:  Nontender, nondistended, normal bowel sounds, hepatosplenomegaly.  PEG tube in place  EXT:  Full ROM of all four extremities, without joint warmth or joint tenderness.        Test Results     Labs/imaging:   reviewed    Tubes, Devices, Monitoring     Telemetry: On      Bravo: no    Assessment and Plan     Patient is a 68-year-old male who presents with cough congestion shortness of breath    Acute hypoxic respiratory failure  Severe sepsis  Healthcare acquired pneumonia  ---continue IV antibiotics  ---id consult    Dysphagia with PEG tube feeding---protein calorie malnutrition to moderate degree                  Consults:    PHARMACY TO DOSE AND MONITOR VANCOMYCIN  IP CONSULT TO NUTRITION SERVICES  IP CONSULT TO NUTRITIONAL SERVICES - TUBE FEEDING  IP CONSULT TO INFECTIOUS DISEASES    Diet:  Tube Feeding Diet Product Not Listed - See Order Comments; Without Diet  Tray  Therapy Orders:    PT and OT Orders Placed this Encounter   Procedures   • Occupational Therapy   • Physical Therapy           Advanced Directives     Code Status: Full Resuscitation           Discharge Plan     The patients treatment plans were discussed with patient.     Recommendations for Discharge   SW     PT Home   OT     SLP f/u with OP speech theraist as needed     Anticipated discharge destination: Home         Barriers to Discharge: Patient is not medically ready and needs to remain in the hospital today due to medical treatment          Jc Almanza MD  Hospitalist  1/10/2022  8:57 PM    (Contact by secure chat)

## 2022-06-10 ENCOUNTER — VIRTUAL VISIT (OUTPATIENT)
Dept: FAMILY MEDICINE | Facility: CLINIC | Age: 32
End: 2022-06-10
Payer: COMMERCIAL

## 2022-06-10 DIAGNOSIS — G35 MS (MULTIPLE SCLEROSIS) (H): ICD-10-CM

## 2022-06-10 DIAGNOSIS — Z28.21 COVID-19 VACCINATION DECLINED: Primary | ICD-10-CM

## 2022-06-10 DIAGNOSIS — Z76.89 ENCOUNTER TO ESTABLISH CARE: ICD-10-CM

## 2022-06-10 PROCEDURE — 99213 OFFICE O/P EST LOW 20 MIN: CPT | Mod: 95 | Performed by: NURSE PRACTITIONER

## 2022-06-10 NOTE — LETTER
Nisa 10, 2022      Ramonita VAZ Ronnie  [address]    [place] [address]        To Whom It May Concern,     Ramonita had virtual care 6/10/22 with myself and we discussed benefits/risks to Covid vaccination and is declining the Covid vaccination.    Sincerely,       Adali Britton, DNP, APRN, CNP

## 2022-06-10 NOTE — PROGRESS NOTES
Ramonita is a 32 year old who is being evaluated via a billable video visit.      How would you like to obtain your AVS? Mail a copy  [address] [address], [place]  [address]   If the video visit is dropped, the invitation should be resent by: Text to cell phone: [phone] - text   Will anyone else be joining your video visit? No      Video Start Time: telephone visit done due to patient not able to connect to video    Assessment & Plan     COVID-19 vaccination declined    Encounter to establish care  Told patient that I am not able to write letter to medically recommend not getting Covid vaccine, because I recommend that she does get it.  But I am willing to write letter stating that we discussed the vaccine and that she is choosing to decline.    MS (multiple sclerosis) (H)  Known issue that I take into account for their medical decisions, no current exacerbations or new concerns.                    Return in about 4 weeks (around 7/8/2022) for return for preventative care Physical Exam.    Adali Britton NP  [facility]   Ramonita is a 32 year old who presents for the following health issues     HPI     Concern - needs a letter  For work   From provider, does not want Covid Vaccine    Daya Hernadez, medical assistant    New patient to this provider.  Asks for a letter today.  She is choosing to decline Covid vaccination and her employer requires a letter.  She is aware of the risks and benefits of the vaccination and that my recommendation is for her to get the Covid vaccine.  Told patient that I am not able to write letter to medically recommend not getting Covid vaccine, because I recommend that she does get it.  But I am willing to write letter stating that we discussed the vaccine and that she is choosing to decline.    H/o MS not followed by neurology, denies symptoms related to this or concerns    Review of Systems   Constitutional, HEENT, cardiovascular, pulmonary,  gi and gu systems are negative, except as otherwise noted.      Objective           Vitals:  No vitals were obtained today due to virtual visit.    Physical Exam   Constitutional:  alert and cooperative.  Patient is speaking in full sentences.  No signs of distress.  Psychiatric: Alert and oriented times 3; coherent speech, normal rate and volume, able to articulate logical thoughts, able to abstract reason, no tangential thoughts, mood appropriate  Respiratory: No cough, no audible wheezing, able to talk in full sentences  Remainder of exam unable to be completed due to telephone visits              Telephone visit 10 minutes

## 2022-09-23 NOTE — PROGRESS NOTES
Assessment & Plan     MS (multiple sclerosis) (H)  Needs neurology   Encouraged her to think about treatment  - Physical Therapy Referral; Future  - Adult Neurology  Referral; Future    Abnormal gait  Referred  Filled out to tung form  - Physical Therapy Referral; Future    Screening for diabetes mellitus    - Hemoglobin A1c; Future  - Basic metabolic panel  (Ca, Cl, CO2, Creat, Gluc, K, Na, BUN); Future  - Hemoglobin A1c  - Basic metabolic panel  (Ca, Cl, CO2, Creat, Gluc, K, Na, BUN)    Patient Instructions   Please check with your insurance and call to schedule at one of the following locations:    [facility] of neurology- they have a location in [place]    [phone]        Schedule with pt    I will follow up with labs    Billin min spent on patient today including chart review, history, exam, and explaining treatment plan and follow-up.       Return in about 4 weeks (around 10/28/2022) for if not improving.    Beba Lara PA-C  [facility] DAMION Shipman is a 32 year old accompanied by her Self, presenting for the following health issues:  Establish Care and Forms      History of Present Illness       Reason for visit:  Disability Parking, Physical Therapist, diabetes test    She eats 2-3 servings of fruits and vegetables daily.She consumes 2 sweetened beverage(s) daily.She exercises with enough effort to increase her heart rate 9 or less minutes per day.  She exercises with enough effort to increase her heart rate 3 or less days per week.   She is taking medications regularly.       New patient to me.  Has parking disability form that was filled out for her today.   Can't walk for long without tripping.  Has a history of MS but has refused treatment due to her concerns regarding side effects.  She arsenio seen at least 2 neurologists but was not happy with what they said because of the risks of the medication. She can't walk for long  "distances without needing to rest due to leg coordination/gait is she would like a referral to physical therapy for this.suyasmany.     She also would like to be tested for diabetes today.      Saw [facility] last year from their notes:  Interval history   Patient diagnosed with MS in the last year has been seen by neurology hesitant to start disease modifying treatment though this is strongly recommended by PCP and neurologist. Says that her best treatment is \"nature\". --VESNA Larry CNP      More info:    Saw neuro 1-18-21. From their notes:    Plan: At today's visit we thoroughly discussed current symptoms, evaluation results, treatment options, and the plan.     I would like to repeat her vitamin D level and adjust her vitamin D dose if necessary.     We decided to continue monitoring her symptoms without treatment.  I highly encouraged her to consider starting disease modifying therapy for multiple sclerosis.     We will plan to repeat her brain and cervical spine MRI's with and without contrast in approximately 12 months from now.  However, she was advised to contact my clinic right away if she experiences any new neurological symptoms.     Next follow-up appointment is in the next 6 months or earlier if needed.     Total Time: 33 minutes spent on the date of the encounter doing chart review, review of test results, interpretation of tests, patient visit and documentation.     Earl Pritchett MD    Review of Systems   Constitutional, HEENT, cardiovascular, pulmonary, GI, , musculoskeletal, neuro, skin, endocrine and psych systems are negative, except as otherwise noted.      Objective    BP 98/63   Pulse 91   Temp 97.7  F (36.5  C) (Tympanic)   Resp 14   Ht 1.524 m (5')   Wt 56.2 kg (124 lb)   SpO2 99%   Breastfeeding No   BMI 24.22 kg/m    Body mass index is 24.22 kg/m .  Physical Exam   GENERAL: healthy, alert and no distress  RESP: lungs clear to auscultation - no rales, rhonchi or wheezes  CV: regular " rate and rhythm, normal S1 S2, no S3 or S4, no murmur, click or rub, no peripheral edema and peripheral pulses strong  MS: no gross musculoskeletal defects noted, no edema  PSYCH: mentation appears normal, affect normal/bright

## 2022-09-30 ENCOUNTER — OFFICE VISIT (OUTPATIENT)
Dept: FAMILY MEDICINE | Facility: CLINIC | Age: 32
End: 2022-09-30
Payer: COMMERCIAL

## 2022-09-30 VITALS
DIASTOLIC BLOOD PRESSURE: 63 MMHG | RESPIRATION RATE: 14 BRPM | SYSTOLIC BLOOD PRESSURE: 98 MMHG | WEIGHT: 124 LBS | OXYGEN SATURATION: 99 % | HEART RATE: 91 BPM | TEMPERATURE: 97.7 F | HEIGHT: 60 IN | BODY MASS INDEX: 24.35 KG/M2

## 2022-09-30 DIAGNOSIS — R26.9 ABNORMAL GAIT: ICD-10-CM

## 2022-09-30 DIAGNOSIS — G35 MS (MULTIPLE SCLEROSIS) (H): Primary | ICD-10-CM

## 2022-09-30 DIAGNOSIS — Z13.1 SCREENING FOR DIABETES MELLITUS: ICD-10-CM

## 2022-09-30 LAB
ANION GAP SERPL CALCULATED.3IONS-SCNC: 6 MMOL/L (ref 3–14)
BUN SERPL-MCNC: 9 MG/DL (ref 7–30)
CALCIUM SERPL-MCNC: 9.5 MG/DL (ref 8.5–10.1)
CHLORIDE BLD-SCNC: 107 MMOL/L (ref 94–109)
CO2 SERPL-SCNC: 25 MMOL/L (ref 20–32)
CREAT SERPL-MCNC: 0.72 MG/DL (ref 0.52–1.04)
GFR SERPL CREATININE-BSD FRML MDRD: >90 ML/MIN/1.73M2
GLUCOSE BLD-MCNC: 85 MG/DL (ref 70–99)
HBA1C MFR BLD: 5.3 % (ref 0–5.6)
POTASSIUM BLD-SCNC: 3.6 MMOL/L (ref 3.4–5.3)
SODIUM SERPL-SCNC: 138 MMOL/L (ref 133–144)

## 2022-09-30 PROCEDURE — 80048 BASIC METABOLIC PNL TOTAL CA: CPT | Performed by: PHYSICIAN ASSISTANT

## 2022-09-30 PROCEDURE — 36415 COLL VENOUS BLD VENIPUNCTURE: CPT | Performed by: PHYSICIAN ASSISTANT

## 2022-09-30 PROCEDURE — 83036 HEMOGLOBIN GLYCOSYLATED A1C: CPT | Performed by: PHYSICIAN ASSISTANT

## 2022-09-30 PROCEDURE — 99203 OFFICE O/P NEW LOW 30 MIN: CPT | Performed by: PHYSICIAN ASSISTANT

## 2022-09-30 NOTE — PATIENT INSTRUCTIONS
Please check with your insurance and call to schedule at one of the following locations:    [facility] of neurology- they have a location in [place]    [phone]        Schedule with pt    I will follow up with labs

## 2022-10-03 NOTE — RESULT ENCOUNTER NOTE
Mario Shipman,       Your recent test results are attached, if you have any questions or concerns please feel free to contact me via e-mail or call [phone].  Sodium and potassium normal. Blood sugar (glucose) normal.  Creatinine and GFR normal, which means kidney function is normal.   A1c shows no diabetes.         It was a pleasure to see you at your recent office visit.      Sincerely,  Beba Lara PA-C

## 2022-10-03 NOTE — TELEPHONE ENCOUNTER
RECORDS RECEIVED FROM: internal   REASON FOR VISIT: MS   Date of Appt: 11/11/22   NOTES (FOR ALL VISITS) STATUS DETAILS   OFFICE NOTE from referring provider Internal Beba COHEN @ [facility]:  9/30/22   OFFICE NOTE from other specialist Internal Dr Pritchett @ [facility]rology:  10/27/20  9/29/20  8/25/20   MEDICATION LIST Internal    IMAGING  (FOR ALL VISITS)     MRI (HEAD, NECK, SPINE) Internal [facility] [place]:  MRI Brain 1/13/21  MRI Cervical Spine 1/13/21  MRI Brain 9/22/20  MRI Cervical Spine 9/22/20  MRI Thoracic Spine 9/22/20

## 2022-11-11 ENCOUNTER — PRE VISIT (OUTPATIENT)
Dept: NEUROLOGY | Facility: CLINIC | Age: 32
End: 2022-11-11

## 2022-11-11 ENCOUNTER — OFFICE VISIT (OUTPATIENT)
Dept: NEUROLOGY | Facility: CLINIC | Age: 32
End: 2022-11-11
Attending: PHYSICIAN ASSISTANT
Payer: COMMERCIAL

## 2022-11-11 VITALS
OXYGEN SATURATION: 98 % | SYSTOLIC BLOOD PRESSURE: 107 MMHG | WEIGHT: 127 LBS | HEART RATE: 118 BPM | DIASTOLIC BLOOD PRESSURE: 63 MMHG | BODY MASS INDEX: 24.8 KG/M2

## 2022-11-11 DIAGNOSIS — G35 MS (MULTIPLE SCLEROSIS) (H): Primary | ICD-10-CM

## 2022-11-11 DIAGNOSIS — G81.91 RIGHT HEMIPARESIS (H): ICD-10-CM

## 2022-11-11 DIAGNOSIS — E55.9 VITAMIN D DEFICIENCY: ICD-10-CM

## 2022-11-11 DIAGNOSIS — R26.0 ATAXIC GAIT: ICD-10-CM

## 2022-11-11 PROCEDURE — 99215 OFFICE O/P EST HI 40 MIN: CPT | Performed by: PSYCHIATRY & NEUROLOGY

## 2022-11-11 RX ORDER — DIAZEPAM 5 MG
TABLET ORAL
Qty: 3 TABLET | Refills: 0 | Status: SHIPPED | OUTPATIENT
Start: 2022-11-11

## 2022-11-11 NOTE — PROGRESS NOTES
Date of Service: 11/11/2022    [facility] Neurology   MS Clinic Evaluation    Subjective: 32-year-old woman who presents for evaluation of multiple sclerosis.    Disease onset likely in her early 20s.  She recalls having an episode of left hand numbness.  This affected her ability to hold onto things.  Dems persisted for approximately 1 year and then resolved.    At age 27 she had an episode of her urinary urgency.  Time thereafter she developed gait instability.    She was seen by neurology previously.  MRI was done and she was diagnosed with multiple sclerosis.  She was advised to start Copaxone, but did not feel that the medication was going to benefit her, therefore did not start treatment.    She has worked with physical therapy in the past and has found this helpful with managing her gait.  She is interested in pursuing this again.    In the past few months she has noticed that she stumbles a bit more and has a tendency to catch her right foot injuring her right leg.  She does do some wall walking.    Disease onset: age 27, urinary urgency, gait instability    DMD hx:   Copaxone     No Known Allergies    Current Outpatient Medications   Medication     vitamin D3 (CHOLECALCIFEROL) 50 mcg (2000 units) tablet     No current facility-administered medications for this visit.        Past medical, surgical, social and family history was personally reviewed. Pertinent details noted above.     Physical Examination:   /63   Pulse 118   Wt 57.6 kg (127 lb)   SpO2 98%   BMI 24.80 kg/m      General: no acute distress  Cranial nerves:   VFFC  PERRL w/no RAPD  EOM full w/no MORENA   Face symmetric  Hearing intact  No dysarthria   Motor:   Tone is normal   Bulk is normal     R L  Deltoid  5 5  Biceps  5 5  Triceps 5 5  Wrist ext 5 5  Finger ext 5 5  Finger abd 5 5    Hip flexion 4 4+  Knee flexion 5- 5  Knee ext 5 5  Ankle d/f 5- 5    Reflexes: 2+ UE, 3+ LE, babinski absent bilaterally  Sensory: vibration is absent in  the left toe and ankle, mildly reduced in the left knee, severely reduced right toe and mild at the ankle, JPS mildly reduced in the toes   Romberg is present  Coordination: LLE ataxia  Gait: ataxic gait, tandem severely impaired    Tests/Imaging:     Vitamin D 19  JCV Ab unk      MRI Brain  9/2020 - high lesion burden with mix of lesions in the periventricular region, deep white matter, charu, T1 holes, gd-  1/2021 - no definite new lesions, gd-     MRI Cervical spine   9/2020 - multiple small eccentric cord lesions, quality of image not the best, gd-  1/2021 - no definite new lesions, gd-     MRI Thoracic spine   9/2020 - again quality not the best, it does appears that there are multiple small lesions, gd-     Assessment: 32-year-old woman with relapsing remitting multiple sclerosis who has been off of disease modifying therapy.  She has a history of discrete relapses, but has residual symptoms related to her prior events.  She would likely benefit from a disease modifying therapy.    I agree with the patient's interest in pursuing physical therapy.  Referral to step therapy was provided.    I completed FMLA forms today to protect her if she has unexpected absences from work because of multiple sclerosis.    Plan:   -MRI brain, cervical and thoracic spine  - Valium for MRI  - Physical therapy  - Blood work today to assess for vitamin deficiency given significant vibratory sensation loss on examination    Note was completed with the assistance of Dragon Fluency software which can often result in accidental word substitutions.     A total of 60 minutes on the date of service were spent in the care of this patient.   Reena Ortega MD on 11/11/2022 at 10:35 AM

## 2022-11-11 NOTE — PATIENT INSTRUCTIONS
You were seen today for MS     I have recommended updating your MRI   Try taking valium before the mri     Visit with physical therapy     Blood work today to check for a virus and vitamin levels     Follow up after MRI to discuss results        Bladder irritants  Coffee, tea and carbonated drinks, even without caffeine.  Alcohol.  Certain acidic fruits -- oranges, grapefruits, cathy and limes -- and fruit juices.  Spicy foods.  Tomato-based products.  Carbonated drinks.  Chocolate.  Beets    Please monitor for the following symptoms:   - decreased vision from one eye, often associated with pain behind the eye  - double vision, often associated with walking difficulty  - numbness/tingling in an arm or leg that progresses over a few days  - weakness in an arm or leg    Symptoms of an MS relapse often progress over several hours or days.  People commonly experience intermittent numbness/tingling.  Only symptoms lasting more than 24 hours are concerning for a new relapse.

## 2022-11-11 NOTE — LETTER
11/11/2022       RE: Ramonita Trujillo  [address]  [address]  [place] [address]     Dear Colleague,    Thank you for referring your patient, Ramonita Trujillo, to the [facility]ULTIPLE S[facility] [place] at [facility]. Please see a copy of my visit note below.    Date of Service: 11/11/2022    [facility] Neurology   MS Clinic Evaluation    Subjective: 32-year-old woman who presents for evaluation of multiple sclerosis.    Disease onset likely in her early 20s.  She recalls having an episode of left hand numbness.  This affected her ability to hold onto things.  Dems persisted for approximately 1 year and then resolved.    At age 27 she had an episode of her urinary urgency.  Time thereafter she developed gait instability.    She was seen by neurology previously.  MRI was done and she was diagnosed with multiple sclerosis.  She was advised to start Copaxone, but did not feel that the medication was going to benefit her, therefore did not start treatment.    She has worked with physical therapy in the past and has found this helpful with managing her gait.  She is interested in pursuing this again.    In the past few months she has noticed that she stumbles a bit more and has a tendency to catch her right foot injuring her right leg.  She does do some wall walking.    Disease onset: age 27, urinary urgency, gait instability    DMD hx:   Copaxone     No Known Allergies    Current Outpatient Medications   Medication     vitamin D3 (CHOLECALCIFEROL) 50 mcg (2000 units) tablet     No current facility-administered medications for this visit.        Past medical, surgical, social and family history was personally reviewed. Pertinent details noted above.     Physical Examination:   /63   Pulse 118   Wt 57.6 kg (127 lb)   SpO2 98%   BMI 24.80 kg/m      General: no acute distress  Cranial nerves:   VFFC  PERRL w/no RAPD  EOM full w/no MORENA    Face symmetric  Hearing intact  No dysarthria   Motor:   Tone is normal   Bulk is normal     R L  Deltoid  5 5  Biceps  5 5  Triceps 5 5  Wrist ext 5 5  Finger ext 5 5  Finger abd 5 5    Hip flexion 4 4+  Knee flexion 5- 5  Knee ext 5 5  Ankle d/f 5- 5    Reflexes: 2+ UE, 3+ LE, babinski absent bilaterally  Sensory: vibration is absent in the left toe and ankle, mildly reduced in the left knee, severely reduced right toe and mild at the ankle, JPS mildly reduced in the toes   Romberg is present  Coordination: LLE ataxia  Gait: ataxic gait, tandem severely impaired    Tests/Imaging:     Vitamin D 19  JCV Ab unk      MRI Brain  9/2020 - high lesion burden with mix of lesions in the periventricular region, deep white matter, charu, T1 holes, gd-  1/2021 - no definite new lesions, gd-     MRI Cervical spine   9/2020 - multiple small eccentric cord lesions, quality of image not the best, gd-  1/2021 - no definite new lesions, gd-     MRI Thoracic spine   9/2020 - again quality not the best, it does appears that there are multiple small lesions, gd-     Assessment: 32-year-old woman with relapsing remitting multiple sclerosis who has been off of disease modifying therapy.  She has a history of discrete relapses, but has residual symptoms related to her prior events.  She would likely benefit from a disease modifying therapy.    I agree with the patient's interest in pursuing physical therapy.  Referral to step therapy was provided.    I completed FMLA forms today to protect her if she has unexpected absences from work because of multiple sclerosis.    Plan:   -MRI brain, cervical and thoracic spine  - Valium for MRI  - Physical therapy  - Blood work today to assess for vitamin deficiency given significant vibratory sensation loss on examination    Note was completed with the assistance of Dragon Fluency software which can often result in accidental word substitutions.     A total of 60 minutes on the date of service were  spent in the care of this patient.     Reena Ortega MD on 11/11/2022 at 10:35 AM

## 2022-11-23 ENCOUNTER — DOCUMENTATION ONLY (OUTPATIENT)
Dept: NEUROLOGY | Facility: CLINIC | Age: 32
End: 2022-11-23

## 2022-11-23 NOTE — PROGRESS NOTES
"Fax not showing as delivered in [employer]. \"Phone line problem\" noted. Attempted x2. Will try again later.    Cintia Tomlinson RN    "

## 2022-11-23 NOTE — PROGRESS NOTES
[employer] / Essential Function Evaluation form completed by Dr Ortega and faxed to [phone] as directed on form. Scanned to chart.    Cintia Tomlinson RN

## 2022-11-30 ENCOUNTER — TELEPHONE (OUTPATIENT)
Dept: NEUROLOGY | Facility: CLINIC | Age: 32
End: 2022-11-30

## 2022-11-30 NOTE — PROGRESS NOTES
Attempted a 4th time to fax the form to fax number [phone], as directed on form. Still not going through. Voicemail message left with pt, asked her to call us back to let us know what she would like us to do with the form.    Cintia Tomlinson RN

## 2022-11-30 NOTE — TELEPHONE ENCOUNTER
Voicemail message left with pt, asking her to call back regarding form completed and vitamin D request.     Dr Ortega has completed the [employer] / Essential Function Evaluation form, but we are unable to fax to the number listed on the form. We have attempted x4 and it does not go through. Asked pt to let us know what she would like us to do with the form.    In addition, we received a prescription request for Vitamin D3 5000 units (125 mcg). Rx listed in our system is for 2000 units (50 mcg). At last clinic visit, Dr Ortega ordered a vitamin D level. This has not yet been completed. Advised pt in message to schedule lab draw. Lab scheduling phone number provided.    Cintia Tomlinson RN

## 2022-12-07 ENCOUNTER — PATIENT OUTREACH (OUTPATIENT)
Dept: CARE COORDINATION | Facility: CLINIC | Age: 32
End: 2022-12-07

## 2022-12-07 ENCOUNTER — VIRTUAL VISIT (OUTPATIENT)
Dept: PSYCHOLOGY | Facility: CLINIC | Age: 32
End: 2022-12-07
Payer: COMMERCIAL

## 2022-12-07 DIAGNOSIS — F32.1 CURRENT MODERATE EPISODE OF MAJOR DEPRESSIVE DISORDER WITHOUT PRIOR EPISODE (H): Primary | ICD-10-CM

## 2022-12-07 DIAGNOSIS — F41.1 GAD (GENERALIZED ANXIETY DISORDER): ICD-10-CM

## 2022-12-07 PROCEDURE — 90791 PSYCH DIAGNOSTIC EVALUATION: CPT | Mod: 95 | Performed by: COUNSELOR

## 2022-12-07 ASSESSMENT — COLUMBIA-SUICIDE SEVERITY RATING SCALE - C-SSRS
2. HAVE YOU ACTUALLY HAD ANY THOUGHTS OF KILLING YOURSELF?: NO
6. HAVE YOU EVER DONE ANYTHING, STARTED TO DO ANYTHING, OR PREPARED TO DO ANYTHING TO END YOUR LIFE?: NO
1. IN THE PAST MONTH, HAVE YOU WISHED YOU WERE DEAD OR WISHED YOU COULD GO TO SLEEP AND NOT WAKE UP?: NO
TOTAL  NUMBER OF INTERRUPTED ATTEMPTS LIFETIME: NO
1. HAVE YOU WISHED YOU WERE DEAD OR WISHED YOU COULD GO TO SLEEP AND NOT WAKE UP?: YES
ATTEMPT LIFETIME: NO
TOTAL  NUMBER OF ABORTED OR SELF INTERRUPTED ATTEMPTS LIFETIME: NO

## 2022-12-07 ASSESSMENT — ANXIETY QUESTIONNAIRES
1. FEELING NERVOUS, ANXIOUS, OR ON EDGE: NOT AT ALL
6. BECOMING EASILY ANNOYED OR IRRITABLE: NOT AT ALL
7. FEELING AFRAID AS IF SOMETHING AWFUL MIGHT HAPPEN: NOT AT ALL
2. NOT BEING ABLE TO STOP OR CONTROL WORRYING: NOT AT ALL
5. BEING SO RESTLESS THAT IT IS HARD TO SIT STILL: NOT AT ALL

## 2022-12-07 ASSESSMENT — PATIENT HEALTH QUESTIONNAIRE - PHQ9
SUM OF ALL RESPONSES TO PHQ QUESTIONS 1-9: 20
5. POOR APPETITE OR OVEREATING: NEARLY EVERY DAY
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
## 2022-12-07 NOTE — PROGRESS NOTES
Clinic Care Coordination Contact  Community Health Worker Initial Outreach    Spoke to Patient (Ramonita)     CHW Introduced intent of call regarding PCP referral    CHW introduce Clinic Care Coordination and Patient responded that support is needed in applying for [facility] benefits such as BRADY, and Energy Assistance. Patient expressed that she is currently living in an Homeless Shelter. Patient also expressed that she is would like assistance with applying for SSDI due to Patient is currently on leave at her job.     CHW acknowledged and scheduled Patient for an CCC Phone Visit with [place] [facility] SW for an  CCC Assessment on 12/08/2022 at 11:00 AM. Patient acknowledged.     CHW acknowledged and provided Patient with CHW contact information for additional support needed.     Chart Review    Referral: PCP (Care Team)   o If Patient is interested in CCC support, schedule Patient for an  CCC Assessment with [place] CCC SW (Valerie)     Reason: Patient is homeless, needs assistance with applying for food stamps and assistance etc    CHW Initial Information Gathering:  Referral Source: Care Team  Current living arrangement:: Other  Type of residence:: Homeless (Patient expressed that she is currently in a Homeless Shelter)  Community Resources: None  Informal Support system:: None  No PCP office visit in Past Year: No  Transportation means:: Regular car  CHW Additional Questions  If ED/Hospital discharge, follow-up appointment scheduled as recommended?: N/A  Medication changes made following ED/Hospital discharge?: N/A  MyChart active?: Yes  Patient sent Social Determinants of Health questionnaire?: No (Patient expressed that she not able to access MyChart at this time)    Financial Resource Worker Screening    [facility] Benefits  Is patient requesting help applying for [facility] benefits?: Yes  Have you recently applied for any [facility] benefits?: No  How many people in your household?: 1 (Patient indicated that she is currently living  at a Homeless Shelter)  Do you buy/eat food together?: Yes  What is the monthly gross income for the household (wages, social security, workers comp, and pension)? : 2500    Any other information for the FRW?: Patient is currently on leave on from her job. Needs support in applying for [place] and other County Benefits and SSDI. Patient requested to be called before 2:00PM due to Patient is unavaiable after 3:00PM.    Care Coordination team will tell patient:   Thank you for answering all the questions, based on screening questions, our Financial Resource Worker will reach out to you with additional questions and next steps.    Patient accepts CC: Yes. Patient scheduled for assessment with MALINA BHATT on 12/08/2022 at 11:00AM. Patient noted desire to discuss upport is needed in applying for county benefits such as BRADY, and Energy Assistance. Patient expressed that she is currently living in an Homeless Shelter. Patient also expressed that she is would like assistance with applying for SSDI due to Patient is currently on leave at her job.     MEHDI Del Cid  Clinic Care Coordination   [facility]   Phone: [phone]  [email or web address]

## 2022-12-08 ENCOUNTER — PATIENT OUTREACH (OUTPATIENT)
Dept: NURSING | Facility: CLINIC | Age: 32
End: 2022-12-08
Payer: COMMERCIAL

## 2022-12-08 SDOH — ECONOMIC STABILITY: INCOME INSECURITY: IN THE LAST 12 MONTHS, WAS THERE A TIME WHEN YOU WERE NOT ABLE TO PAY THE MORTGAGE OR RENT ON TIME?: YES

## 2022-12-08 SDOH — ECONOMIC STABILITY: TRANSPORTATION INSECURITY
IN THE PAST 12 MONTHS, HAS LACK OF TRANSPORTATION KEPT YOU FROM MEETINGS, WORK, OR FROM GETTING THINGS NEEDED FOR DAILY LIVING?: NO

## 2022-12-08 SDOH — HEALTH STABILITY: PHYSICAL HEALTH: ON AVERAGE, HOW MANY MINUTES DO YOU ENGAGE IN EXERCISE AT THIS LEVEL?: 0 MIN

## 2022-12-08 SDOH — ECONOMIC STABILITY: TRANSPORTATION INSECURITY
IN THE PAST 12 MONTHS, HAS THE LACK OF TRANSPORTATION KEPT YOU FROM MEDICAL APPOINTMENTS OR FROM GETTING MEDICATIONS?: NO

## 2022-12-08 SDOH — HEALTH STABILITY: PHYSICAL HEALTH: ON AVERAGE, HOW MANY DAYS PER WEEK DO YOU ENGAGE IN MODERATE TO STRENUOUS EXERCISE (LIKE A BRISK WALK)?: 0 DAYS

## 2022-12-08 SDOH — ECONOMIC STABILITY: FOOD INSECURITY: WITHIN THE PAST 12 MONTHS, YOU WORRIED THAT YOUR FOOD WOULD RUN OUT BEFORE YOU GOT MONEY TO BUY MORE.: NEVER TRUE

## 2022-12-08 SDOH — ECONOMIC STABILITY: FOOD INSECURITY: WITHIN THE PAST 12 MONTHS, THE FOOD YOU BOUGHT JUST DIDN'T LAST AND YOU DIDN'T HAVE MONEY TO GET MORE.: SOMETIMES TRUE

## 2022-12-08 ASSESSMENT — SOCIAL DETERMINANTS OF HEALTH (SDOH)
IN A TYPICAL WEEK, HOW MANY TIMES DO YOU TALK ON THE PHONE WITH FAMILY, FRIENDS, OR NEIGHBORS?: PATIENT DECLINED
DO YOU BELONG TO ANY CLUBS OR ORGANIZATIONS SUCH AS CHURCH GROUPS UNIONS, FRATERNAL OR ATHLETIC GROUPS, OR SCHOOL GROUPS?: NO
HOW HARD IS IT FOR YOU TO PAY FOR THE VERY BASICS LIKE FOOD, HOUSING, MEDICAL CARE, AND HEATING?: SOMEWHAT HARD
WITHIN THE LAST YEAR, HAVE YOU BEEN AFRAID OF YOUR PARTNER OR EX-PARTNER?: NO
HOW OFTEN DO YOU GET TOGETHER WITH FRIENDS OR RELATIVES?: PATIENT DECLINED
WITHIN THE LAST YEAR, HAVE YOU BEEN KICKED, HIT, SLAPPED, OR OTHERWISE PHYSICALLY HURT BY YOUR PARTNER OR EX-PARTNER?: NO
HOW OFTEN DO YOU ATTEND CHURCH OR RELIGIOUS SERVICES?: NEVER
ARE YOU MARRIED, WIDOWED, DIVORCED, SEPARATED, NEVER MARRIED, OR LIVING WITH A PARTNER?: PATIENT DECLINED
HOW OFTEN DO YOU ATTENT MEETINGS OF THE CLUB OR ORGANIZATION YOU BELONG TO?: PATIENT DECLINED
WITHIN THE LAST YEAR, HAVE YOU BEEN HUMILIATED OR EMOTIONALLY ABUSED IN OTHER WAYS BY YOUR PARTNER OR EX-PARTNER?: NO
HOW HARD IS IT FOR YOU TO PAY FOR THE VERY BASICS LIKE FOOD, HOUSING, MEDICAL CARE, AND HEATING?: HARD
WITHIN THE LAST YEAR, HAVE TO BEEN RAPED OR FORCED TO HAVE ANY KIND OF SEXUAL ACTIVITY BY YOUR PARTNER OR EX-PARTNER?: NO

## 2022-12-08 ASSESSMENT — LIFESTYLE VARIABLES
HOW OFTEN DO YOU HAVE A DRINK CONTAINING ALCOHOL: NEVER
SKIP TO QUESTIONS 9-10: 1
HOW OFTEN DO YOU HAVE SIX OR MORE DRINKS ON ONE OCCASION: NEVER
HOW MANY STANDARD DRINKS CONTAINING ALCOHOL DO YOU HAVE ON A TYPICAL DAY: PATIENT DOES NOT DRINK
AUDIT-C TOTAL SCORE: 0

## 2022-12-08 ASSESSMENT — ACTIVITIES OF DAILY LIVING (ADL): DEPENDENT_IADLS:: INDEPENDENT

## 2022-12-08 NOTE — LETTER
[facility] CARE COORDINATION  [address] [facility] [address] [address]    December 8, 2022    Ramonita MILIND Trujillo  [address]    [place] [address]      Dear Ramonita,      I am a clinic care coordinator who works with Adali Britton NP with the [facility]. I wanted to thank you for spending the time to talk with me.  Below is a description of clinic care coordination and how I can further assist you.       The clinic care coordination team is made up of a registered nurse, , financial resource worker and community health worker who understand the health care system. The goal of clinic care coordination is to help you manage your health and improve access to the health care system. Our team works alongside your provider to assist you in determining your health and social needs. We can help you obtain health care and community resources, providing you with necessary information and education. We can work with you through any barriers and develop a care plan that helps coordinate and strengthen the communication between you and your care team.    Please feel free to contact me with any questions or concerns regarding care coordination and what we can offer.      We are focused on providing you with the highest-quality healthcare experience possible.    Sincerely,     Valerie Chase, GABE, MSW Clinic   [facility]  Care Coordination  [facility]   [email or web address]  Office: [phone]  Employed by [employer]       Enclosed: I have enclosed a copy of a 24 Hour Access Plan. This has helpful phone numbers for you to call when needed. Please keep this in an easy to access place to use as needed.

## 2022-12-08 NOTE — PROGRESS NOTES
Clinic Care Coordination Contact    Clinic Care Coordination Contact  OUTREACH    Referral Information: [facility] initial phone assessment     Referral Source: Care Team    Primary Diagnosis: Psychosocial    Chief Complaint   Patient presents with     Clinic Care Coordination - Initial              Universal Utilization: Patient sees neurology for MS dx.  Patient has history of a recent no show to appt  Clinic Utilization  Difficulty keeping appointments:: Yes  Compliance Concerns: Yes  No-Show Concerns: Yes  No PCP office visit in Past Year: No  Utilization    Hospital Admissions  0             ED Visits  0             No Show Count (past year)  1                Current as of: 12/8/2022  3:31 AM          Clinical Concerns:  Current Medical Concerns:    Patient Active Problem List   Diagnosis     CARDIOVASCULAR SCREENING; LDL GOAL LESS THAN 160     MS (multiple sclerosis) (H)      Current Behavioral Concerns:  Patient reports situational depression due to housing concern and lift stressors.  She has depression and CHUCK dx's.  She reports being unable to work due to complications from health (MS), she is on leave from employment as must complete ADA paperwork to return to suitable position.  She expresses having limited social/emotional support, frequent suicidal ideation but no plan/intent.  Current housing may be temporary (has had 3-4 warnings at shelter, the 5th will likely be eviction) and will need to secure permanent housing sooner than later due to health concerns. Patient given 988# for emotional support resources and we discussed disability resources for potential long term need.  Patient has psychologist whom she had virtual visit with yesterday, she takes Valium as needed.  Please see psychology visit note for specifics.  [facility] provided her contact information.      Education Provided to patient: Disability Hub, Disability Specialists, FRW referral, ADA application for employment, 988#  Pain  Pain (GOAL)::  Yes  Type: Chronic (>3mo)  Location of chronic pain:: joints, muscles from MS  Radiating: Yes  Location pain radiates to: throughout muscles, varies due to MS  Progression: Waxing and Waning  Description of pain: Numb, Aching  Chronic pain severity:: 7  Limitation of routine activities due to chronic pain:: Yes  Description: Able to do most things most days with some rest  Alleviating Factors: Rest, Heat  Aggravating Factors: Activity, Positioning (standing long periods of time)  Health Maintenance Reviewed: Due/Overdue   Health Maintenance Due   Topic Date Due     ADVANCE CARE PLANNING  Never done     DEPRESSION ACTION PLAN  Never done     COVID-19 Vaccine (1) Never done     HEPATITIS B IMMUNIZATION (2 of 3 - 3-dose series) 10/01/2003     YEARLY PREVENTIVE VISIT  08/04/2021     Clinical Pathway: None    Medication Management:  Medication review status: Medications reviewed and no changes reported per patient.           Functional Status:  Dependent ADL's:: Independent  Dependent IADLs:: Independent  Bed or wheelchair confined:: No  Mobility Status: Independent  Fallen 2 or more times in the past year?: No  Any fall with injury in the past year?: No    Living Situation:  Current living arrangement:: Other ([facility] [place])  Type of residence:: Homeless ([facility] [place])    Lifestyle & Psychosocial Needs:    Social Determinants of Health     Tobacco Use: Low Risk      Smoking Tobacco Use: Never     Smokeless Tobacco Use: Never     Passive Exposure: Not on file   Alcohol Use: Not At Risk     Frequency of Alcohol Consumption: Never     Average Number of Drinks: Patient does not drink     Frequency of Binge Drinking: Never   Financial Resource Strain: High Risk     Difficulty of Paying Living Expenses: Hard   Food Insecurity: Food Insecurity Present     Worried About Running Out of Food in the Last Year: Never true     Ran Out of Food in the Last Year: Sometimes true   Transportation Needs:  No Transportation Needs     Lack of Transportation (Medical): No     Lack of Transportation (Non-Medical): No   Physical Activity: Inactive     Days of Exercise per Week: 0 days     Minutes of Exercise per Session: 0 min   Stress: Stress Concern Present     Feeling of Stress : Rather much   Social Connections: Unknown     Frequency of Communication with Friends and Family: Patient refused     Frequency of Social Gatherings with Friends and Family: Patient refused     Attends Synagogue Services: Never     Active Member of Clubs or Organizations: No     Attends Club or Organization Meetings: Patient refused     Marital Status: Patient refused   Intimate Partner Violence: Not At Risk     Fear of Current or Ex-Partner: No     Emotionally Abused: No     Physically Abused: No     Sexually Abused: No   Depression: At risk     PHQ-2 Score: 6   Housing Stability: High Risk     Unable to Pay for Housing in the Last Year: Yes     Number of Places Lived in the Last Year: Not on file     Unstable Housing in the Last Year: Yes     Diet:: Regular  Inadequate nutrition (GOAL):: No  Tube Feeding: No  Inadequate activity/exercise (GOAL):: No  Significant changes in sleep pattern (GOAL): No (pain wakes her at night, not new)  Transportation means:: Regular car     Synagogue or spiritual beliefs that impact treatment:: No  Mental health DX:: Yes  Mental health DX how managed:: Medication  Mental health management concern (GOAL):: Yes  Chemical Dependency Status: Current Concern (occasional marijuana)  Chemical Dependency Management: Other (see comment) (see above)  Informal Support system:: None        Resources and Interventions: Disability Hub, Disability Specialists, FRW referral, ADA application for employment, 988#  Current Resources: (neurology, psychology)     Community Resources: Other (see comment) (neurology, psychology)  Supplies Currently Used at Home: None  Equipment Currently Used at Home: none  Employment Status: other  (see comments) (on leave at work, she is to complete ADA paperwork, they will find her a suitable job when she returns)     Advance Care Plan/Directive  Advanced Care Plans/Directives on file:: No  Advanced Care Plan/Directive Status: Declined Further Information    Referrals Placed: Other  Disability Specialists, Disability Linkage line, completion of ADA paperwork, FRW referral, 988#     Care Plan:  Care Plan: Mental Health     Problem: Limited support resources     Goal: Establish supportive resources and stable housing     Start Date: 12/8/2022 Expected End Date: 6/8/2023    This Visit's Progress: 20%    Priority: High    Note:     Barriers: Unable to work due to complications from health, on leave from employment as must complete ADA paperwork to return to suitable position, limited social and emotional support, frequent suicidal ideation but no plan/intent.  Current housing may be temporary and will need to secure permanent housing sooner than later    Strengths: Motivated to complete paperwork, given 988# for emotional support resources, has current housing, plan to return to work if able, discussed disability resources with SW   Patient expressed understanding of goal: yes  Action steps to achieve this goal:  1. I will take all medications as prescribed, I will attend all scheduled appts   2. I will seen out emotional support as needed  3. I will utilize 988# if needed for emotional support   4. I will reach out to SW and/or care team for further support resources as needed                      Care Plan: Community Support     Problem: No current consistent income     Goal: I will research and inquire on disability process     Start Date: 12/8/2022 Expected End Date: 4/7/2023    This Visit's Progress: 0%    Priority: Medium    Note:     Barriers: No current consistent income, difficulty working due to limitations with MS, may lose temporary housing in the future, does not have another option at present    Strengths: Needs to complete ADA paperwork for potential return under disability restrictions, SW discussed disability resources long term and patient will call and inquire   Patient expressed understanding of goal: yes  Action steps to achieve this goal:  1. I will find time to call on disability resources   2. I will discuss my current needs   3. I will call for consultation as desired   4. I will complete ADA paperwork, return to work and discuss next steps                           Patient/Caregiver understanding:     Outreach Frequency: 1-2 weeks  Future Appointments              In 1 week [address]    In 1 week [address]    In 1 week [address]    In 1 week Moniqeu Palm LPCC [facility], [facility] [place]    In 2 weeks Reena Ortega MD [facility] Multiple Sclerosis [facility]          Plan:   1) Patient will reach out for emotional support from care team, 988# as needed   2) Patient will research and inquire on disability process   3) SW will send introduction letter and Complex care plan   4) SW will update PCP and call patient in 1 week for update and needs assessment     Valerie Chase, ANNMARIEW, MSW   [facility]  Care Coordination  [facility]  [phone]  12/8/2022 4:22 PM     Financial Resource Worker Screening    [facility] Benefits  Is patient requesting help applying for [facility] benefits?: Yes  Have you recently applied for any [facility] benefits?: No  How many people in your household?: 1  Do you buy/eat food together?: No    Insurance:  [place]?: No  Is this an insurance renewal?: No  Is this a new insurance application request?: No    Any other information for the FRW?: Patient would like to  apply for SNAP, cash assistance    Care Coordination team will tell patient:   Thank you for answering all the questions, based on screening questions, our Financial Resource Worker will reach out to you with additional questions and next steps.

## 2022-12-08 NOTE — LETTER
[facility]  Patient Centered Plan of Care  About Me:        Patient Name:  Ramonita Trujillo    YOB: 1990  Age:         32 year old   Nelly MRN:    [id number] Telephone Information:  Home Phone [phone]   Mobile [phone]       Address:  [address] [place] [address] [address]  [facility] [address] Email address:  [email or web address]      Emergency Contact(s)    Name Relationship Lgl Grd Work Phone Home Phone Mobile Phone   1. MIRANDA HECK* Sister  [phone]           Primary language:  English     needed? No   [place] [employer]:  [phone] op. 1  Other communication barriers:Physical impairment; Lack of coping    Preferred Method of Communication:  Mail  Current living arrangement: Other ([facility] [place])    Mobility Status/ Medical Equipment: Independent        Health Maintenance  Health Maintenance Reviewed: Due/Overdue ***      My Access Plan  Medical Emergency 911   Primary Clinic Line [facility] [phone]   24 Hour Appointment Line [phone] or  [phone]) (toll-free)   24 Hour Nurse Line [phone] (toll-free)   Preferred Urgent Care [facility] [phone]     Preferred Hospital [facility]  [phone]     Preferred Pharmacy [id number] - [place], [place] - [address] [facility]     Behavioral Health Crisis Line The National Suicide Prevention Lifeline at 1-666.460.5233 or Text/Call 128             My Care Team Members  Patient Care Team       Relationship Specialty Notifications Start End    Adali Britton NP PCP - General Nurse Practitioner - Family  6/10/22     Phone: [phone] Fax: [phone]         [facility] [address]    Adali Britton NP Assigned PCP   6/25/22     Phone: [phone] Fax: [phone]         [employer]  [facility] [address]    Reena Ortega MD Assigned Neuroscience Provider   11/12/22     Phone: [phone] Fax: [phone]         [address] [facility] [address]    Karen Kaba Community Health Worker Primary Care - CC Admissions 12/7/22 12/8/22    Phone: [phone] Fax: [phone]        Valerie Chase BSW Lead Care Coordinator Primary Care - CC Admissions 12/8/22      Care Coordination [facility]    Nevaeh Huston MA Financial Resource Worker   12/8/22             My Care Plans  Self Management and Treatment Plan  Care Plan  Care Plan: Mental Health     Problem: Limited support resources     Goal: Establish supportive resources and stable housing     Start Date: 12/8/2022 Expected End Date: 6/8/2023    This Visit's Progress: 20%    Priority: High    Note:     Barriers: Current moderate depressive disorder and CHUCK dx's.  Unable to work due to complications from health, on leave from employment as must complete ADA paperwork to return to suitable position, limited social and emotional support, frequent suicidal ideation but no plan/intent.  Current housing may be temporary and will need to secure permanent housing sooner than later    Strengths: Motivated to complete paperwork, given 988# for emotional support resources, has current housing, plan to return to work if able, discussed disability resources with SW.  Has psychologist, virtual visit 12/7/2022   Patient expressed understanding of goal: yes  Action steps to achieve this goal:  1. I will take all medications as prescribed, I will attend all scheduled appts   2. I will seen out emotional support as needed  3. I will utilize 988# if needed for emotional support   4. I will reach out to SW and/or psychologist for further support/resources as needed                      Care Plan: Community Support     Problem: No current consistent income     Goal: I will research and inquire on disability  process     Start Date: 12/8/2022 Expected End Date: 4/7/2023    This Visit's Progress: 0%    Priority: Medium    Note:     Barriers: No current consistent income, difficulty working due to limitations with MS, may lose temporary housing in the future, does not have another option at present   Strengths: Needs to complete ADA paperwork for potential return under disability restrictions, SW discussed disability resources long term and patient will call and inquire   Patient expressed understanding of goal: yes  Action steps to achieve this goal:  1. I will find time to call on disability resources   2. I will discuss my current needs   3. I will call for consultation as desired   4. I will complete ADA paperwork, return to work and discuss next steps                            Action Plans on File:                       Advance Care Plans/Directives Type:   No data recorded    My Medical and Care Information  Problem List   Patient Active Problem List   Diagnosis     CARDIOVASCULAR SCREENING; LDL GOAL LESS THAN 160     MS (multiple sclerosis) (H)      Current Medications and Allergies:  See printed Medication Report.    Care Coordination Start Date: 12/7/2022   Frequency of Care Coordination: 2 weeks     Form Last Updated: 12/08/2022

## 2022-12-08 NOTE — LETTER
[facility]  Patient Centered Plan of Care  About Me:        Patient Name:  Ramonita Trujillo    YOB: 1990  Age:         32 year old   Nelly MRN:    [id number] Telephone Information:  Home Phone [phone]   Mobile [phone]       Address:  [address] [place] [address] Email address:  [email or web address]      Emergency Contact(s)    Name Relationship Lgl Grd Work Phone Home Phone Mobile Phone   1. MIRANDA HECK* Sister  [phone]           Primary language:  English     needed? No   [employer] [employer]:  [phone] op. 1  Other communication barriers:Physical impairment; Lack of coping    Preferred Method of Communication:  Mail  Current living arrangement: Other ([facility] [place])    Mobility Status/ Medical Equipment: Independent        Health Maintenance  Health Maintenance Reviewed: Due/Overdue   Health Maintenance Due   Topic Date Due     ADVANCE CARE PLANNING  Never done     DEPRESSION ACTION PLAN  Never done     COVID-19 Vaccine (1) Never done     HEPATITIS B IMMUNIZATION (2 of 3 - 3-dose series) 10/01/2003     YEARLY PREVENTIVE VISIT  08/04/2021         My Access Plan  Medical Emergency 911   Primary Clinic Line [facility] [phone]   24 Hour Appointment Line [phone] or  [phone]) (toll-free)   24 Hour Nurse Line [phone] (toll-free)   Preferred Urgent Care [facility] [phone]     Preferred Hospital [facility]  [phone]     Preferred [facility]acy [id number] - [place], [place] - [address]     Behavioral Health Crisis Line The National Suicide Prevention Lifeline at 1-853.671.9938 or Text/Call 188             My Care Team Members  Patient Care Team       Relationship Specialty Notifications Start End    [place],  Adali VARGAS NP PCP - General Nurse Practitioner - Family  6/10/22     Phone: [phone] Fax: [phone]         [address] [facility] [address]    Adali Britton NP Assigned PCP   6/25/22     Phone: [phone] Fax: [phone]         [address] [place] [address]    Reena Ortega MD Assigned Neuroscience Provider   11/12/22     Phone: [phone] Fax: [phone] [address] [facility] [address]    Karen Kaba Community Health Worker Primary Care - CC Admissions 12/7/22 12/8/22    Phone: [phone] Fax: [phone]        Valerie Chase BSW Lead Care Coordinator Primary Care - CC Admissions 12/8/22      Care Coordination [facility]    Nevaeh Huston MA Financial Resource Worker   12/8/22             My Care Plans  Self Management and Treatment Plan  Care Plan  Care Plan: Mental Health     Problem: Limited support resources     Goal: Establish supportive resources and stable housing     Start Date: 12/8/2022 Expected End Date: 6/8/2023    This Visit's Progress: 20%    Priority: High    Note:     Barriers: Current moderate depressive disorder and CHUCK dx's.  Unable to work due to complications from health, on leave from employment as must complete ADA paperwork to return to suitable position, limited social and emotional support, frequent suicidal ideation but no plan/intent.  Current housing may be temporary and will need to secure permanent housing sooner than later    Strengths: Motivated to complete paperwork, given 988# for emotional support resources, has current housing, plan to return to work if able, discussed disability resources with SW.  Has psychologist, virtual visit 12/7/2022   Patient expressed understanding of goal: yes  Action steps to achieve this goal:  1. I will take all medications as prescribed, I will attend all scheduled appts   2. I will seen out emotional support as needed  3. I will utilize 988# if  needed for emotional support   4. I will reach out to SW and/or psychologist for further support/resources as needed                      Care Plan: Community Support     Problem: No current consistent income     Goal: I will research and inquire on disability process     Start Date: 12/8/2022 Expected End Date: 4/7/2023    This Visit's Progress: 0%    Priority: Medium    Note:     Barriers: No current consistent income, difficulty working due to limitations with MS, may lose temporary housing in the future, does not have another option at present   Strengths: Needs to complete ADA paperwork for potential return under disability restrictions, SW discussed disability resources long term and patient will call and inquire   Patient expressed understanding of goal: yes  Action steps to achieve this goal:  1. I will find time to call on disability resources   2. I will discuss my current needs   3. I will call for consultation as desired   4. I will complete ADA paperwork, return to work and discuss next steps                            Action Plans on File: none                      Advance Care Plans/Directives Type: none      My Medical and Care Information  Problem List   Patient Active Problem List   Diagnosis     CARDIOVASCULAR SCREENING; LDL GOAL LESS THAN 160     MS (multiple sclerosis) (H)          Care Coordination Start Date: 12/7/2022   Frequency of Care Coordination: 2 weeks     Form Last Updated: 12/08/2022

## 2022-12-09 ENCOUNTER — PATIENT OUTREACH (OUTPATIENT)
Dept: CARE COORDINATION | Facility: CLINIC | Age: 32
End: 2022-12-09

## 2022-12-09 NOTE — PROGRESS NOTES
Clinic Care Coordination Contact  Program:  [facility]: [facility] Case #:  [facility] Worker:   Gal #:   Subscriber #:   Renewal:  Date Applied:     FRW Outreach:   2022 FRW called patient and at this time she is over income to apply for any benefits at this time.     SNAP/CASH Application Screenin. Have you had [facility] benefits before?  2. How many people in the household, do you eat/buy food together?  3. What is your monthly income (include all tax members)?   4. Do you have a bank account?   5. Do you have any utility bills (electricity, rent, mortgage, phone, insurance, medical bills, etc.)?   6. Do you have social security cards and/or green cards?       Health Insurance:      Referral/Screening:  [facility] Screening    Row Name 22 [phone]       [facility] Benefits   Is patient requesting help applying for [facility] benefits? Yes       Have you recently applied for any [facility] benefits? No       How many people in your household? 1  Patient indicated that she is currently living at a Homeless Shelter       Do you buy/eat food together? Yes       What is the monthly gross income for the household (wages, social security, workers comp, and pension)?  2500       OTHER   Any other information for the FRW? Patient is currently on leave on from her job. Needs support in applying for [facility] and other [facility] Benefits and SSDI. Patient requested to be called before 2:00PM due to Patient is unavaiable after 3:00PM.

## 2022-12-17 ENCOUNTER — ANCILLARY PROCEDURE (OUTPATIENT)
Dept: MRI IMAGING | Facility: CLINIC | Age: 32
End: 2022-12-17
Attending: PSYCHIATRY & NEUROLOGY
Payer: COMMERCIAL

## 2022-12-17 DIAGNOSIS — G35 MS (MULTIPLE SCLEROSIS) (H): ICD-10-CM

## 2022-12-17 PROCEDURE — 70553 MRI BRAIN STEM W/O & W/DYE: CPT | Mod: GC | Performed by: STUDENT IN AN ORGANIZED HEALTH CARE EDUCATION/TRAINING PROGRAM

## 2022-12-17 PROCEDURE — 72156 MRI NECK SPINE W/O & W/DYE: CPT | Mod: GC | Performed by: STUDENT IN AN ORGANIZED HEALTH CARE EDUCATION/TRAINING PROGRAM

## 2022-12-17 PROCEDURE — A9585 GADOBUTROL INJECTION: HCPCS | Performed by: STUDENT IN AN ORGANIZED HEALTH CARE EDUCATION/TRAINING PROGRAM

## 2022-12-17 PROCEDURE — 72157 MRI CHEST SPINE W/O & W/DYE: CPT | Mod: GC | Performed by: STUDENT IN AN ORGANIZED HEALTH CARE EDUCATION/TRAINING PROGRAM

## 2022-12-17 RX ORDER — GADOBUTROL 604.72 MG/ML
6 INJECTION INTRAVENOUS ONCE
Status: COMPLETED | OUTPATIENT
Start: 2022-12-17 | End: 2022-12-17

## 2022-12-17 RX ADMIN — GADOBUTROL 6 ML: 604.72 INJECTION INTRAVENOUS at 08:44

## 2022-12-17 NOTE — DISCHARGE INSTRUCTIONS

## 2022-12-17 NOTE — DISCHARGE INSTRUCTIONS

## 2022-12-20 ENCOUNTER — PATIENT OUTREACH (OUTPATIENT)
Dept: CARE COORDINATION | Facility: CLINIC | Age: 32
End: 2022-12-20

## 2022-12-20 NOTE — PROGRESS NOTES
Clinic Care Coordination Contact    Follow Up Progress Note     Assessment: Call to patient for update.  Patient reports having researched [employer] web site regarding disability process. She will call them or chat online about specific next steps to apply.  She states she has this information.  Mood was bright and she was very thankful for the assistance. Per chart review, patient exceeds income guidelines for cash and food benefits.      Care Gaps: did not address, will on future call     Health Maintenance Due   Topic Date Due     ADVANCE CARE PLANNING  Never done     DEPRESSION ACTION PLAN  Never done     COVID-19 Vaccine (1) Never done     HEPATITIS B IMMUNIZATION (2 of 3 - 3-dose series) 10/01/2003     YEARLY PREVENTIVE VISIT  08/04/2021       Care Plans  Care Plan: Mental Health     Problem: Limited support resources     Goal: Establish supportive resources and stable housing     Start Date: 12/8/2022 Expected End Date: 6/8/2023    This Visit's Progress: 20% Recent Progress: 20%    Priority: High    Note:     Barriers: Current moderate depressive disorder and CHUCK dx's.  Unable to work due to complications from health, on leave from employment as must complete ADA paperwork to return to suitable position, limited social and emotional support, frequent suicidal ideation but no plan/intent.  Current housing may be temporary and will need to secure permanent housing sooner than later    Strengths: Motivated to complete paperwork, given 988# for emotional support resources, has current housing, plan to return to work if able, discussed disability resources with SW.  Has psychologist, virtual visit 12/7/2022   Patient expressed understanding of goal: yes  Action steps to achieve this goal:  1. I will take all medications as prescribed, I will attend all scheduled appts   2. I will seen out emotional support as needed  3. I will utilize 988# if needed for emotional support   4. I will reach out to SW and/or  psychologist for further support/resources as needed                      Care Plan: Community Support     Problem: No current consistent income     Goal: I will research and inquire on disability process     Start Date: 12/8/2022 Expected End Date: 4/7/2023    This Visit's Progress: 20% Recent Progress: 0%    Priority: Medium    Note:     Barriers: No current consistent income, difficulty working due to limitations with MS, may lose temporary housing in the future, does not have another option at present   Strengths: ADA paperwork completed for potential return under disability restrictions, SW discussed disability resources long term and patient will call and inquire   Patient expressed understanding of goal: yes  Action steps to achieve this goal:  1. I will find time to call on disability resources   2. I will discuss my current needs   3. I will call for consultation as desired   4. I will complete ADA paperwork, return to work and discuss next steps                           Intervention/Education provided during outreach: none      Outreach Frequency: monthly    Plan:     Care Coordinator will follow up in 1 month     GABE Waldron, MSW   [facility]  Care Coordination  [employer] [facility]  [phone]  12/20/2022 1:45 PM

## 2022-12-28 ENCOUNTER — LAB (OUTPATIENT)
Dept: LAB | Facility: CLINIC | Age: 32
End: 2022-12-28
Payer: COMMERCIAL

## 2022-12-28 ENCOUNTER — OFFICE VISIT (OUTPATIENT)
Dept: NEUROLOGY | Facility: CLINIC | Age: 32
End: 2022-12-28
Attending: PSYCHIATRY & NEUROLOGY
Payer: COMMERCIAL

## 2022-12-28 VITALS
BODY MASS INDEX: 25.43 KG/M2 | OXYGEN SATURATION: 98 % | DIASTOLIC BLOOD PRESSURE: 69 MMHG | WEIGHT: 130.2 LBS | SYSTOLIC BLOOD PRESSURE: 113 MMHG | HEART RATE: 76 BPM

## 2022-12-28 DIAGNOSIS — Z11.3 ROUTINE SCREENING FOR STI (SEXUALLY TRANSMITTED INFECTION): ICD-10-CM

## 2022-12-28 DIAGNOSIS — G35 MS (MULTIPLE SCLEROSIS) (H): ICD-10-CM

## 2022-12-28 DIAGNOSIS — R26.0 ATAXIC GAIT: ICD-10-CM

## 2022-12-28 DIAGNOSIS — G81.91 RIGHT HEMIPARESIS (H): ICD-10-CM

## 2022-12-28 DIAGNOSIS — E55.9 VITAMIN D DEFICIENCY: ICD-10-CM

## 2022-12-28 DIAGNOSIS — G35 MS (MULTIPLE SCLEROSIS) (H): Primary | ICD-10-CM

## 2022-12-28 LAB
DEPRECATED CALCIDIOL+CALCIFEROL SERPL-MC: 32 UG/L (ref 20–75)
FOLATE SERPL-MCNC: 18 NG/ML (ref 4.6–34.8)
T PALLIDUM AB SER QL: NONREACTIVE

## 2022-12-28 PROCEDURE — 86632 CHLAMYDIA IGM ANTIBODY: CPT | Performed by: PSYCHIATRY & NEUROLOGY

## 2022-12-28 PROCEDURE — 82306 VITAMIN D 25 HYDROXY: CPT | Performed by: PSYCHIATRY & NEUROLOGY

## 2022-12-28 PROCEDURE — 86780 TREPONEMA PALLIDUM: CPT | Performed by: PSYCHIATRY & NEUROLOGY

## 2022-12-28 PROCEDURE — 82746 ASSAY OF FOLIC ACID SERUM: CPT | Performed by: PSYCHIATRY & NEUROLOGY

## 2022-12-28 PROCEDURE — G0463 HOSPITAL OUTPT CLINIC VISIT: HCPCS | Performed by: PSYCHIATRY & NEUROLOGY

## 2022-12-28 PROCEDURE — G0463 HOSPITAL OUTPT CLINIC VISIT: HCPCS

## 2022-12-28 PROCEDURE — 86631 CHLAMYDIA ANTIBODY: CPT | Performed by: PSYCHIATRY & NEUROLOGY

## 2022-12-28 PROCEDURE — 36415 COLL VENOUS BLD VENIPUNCTURE: CPT | Performed by: PSYCHIATRY & NEUROLOGY

## 2022-12-28 PROCEDURE — 99213 OFFICE O/P EST LOW 20 MIN: CPT | Performed by: PSYCHIATRY & NEUROLOGY

## 2022-12-28 ASSESSMENT — PAIN SCALES - GENERAL: PAINLEVEL: NO PAIN (0)

## 2022-12-28 NOTE — NURSING NOTE
Chief Complaint   Patient presents with     MS     RECHECK     Follow up with MRI and labs prior      Vitals were taken and medications were reconciled.  Kei Herrera, EMT  8:57 AM

## 2022-12-28 NOTE — LETTER
12/28/2022       RE: Ramonita Trujillo  [address]  [address]  [place] [address]       Dear Colleague,    Thank you for referring your patient, Ramonita Trujillo, to the [facility] MULTIPLE [facility]OSIS CLINIC [place] [facility]. Please see a copy of my visit note below.    Date of Service: 12/28/2022    [facility] Neurology   MS Clinic Evaluation    Subjective: 32-year-old woman who presents for evaluation of multiple sclerosis.    She presents today to discuss results of her recent testing.    She has not been contacted to schedule physical therapy.    Disease onset: age 27, urinary urgency, gait instability    DMD hx:   Copaxone     No Known Allergies    Current Outpatient Medications   Medication     diazepam (VALIUM) 5 MG tablet     vitamin D3 (CHOLECALCIFEROL) 50 mcg (2000 units) tablet     No current facility-administered medications for this visit.        Past medical, surgical, social and family history was personally reviewed. Pertinent details noted above.     Physical Examination:   /69 (BP Location: Left arm, Patient Position: Sitting, Cuff Size: Adult Regular)   Pulse 76   Wt 59.1 kg (130 lb 3.2 oz)   SpO2 98%   BMI 25.43 kg/m      General: no acute distress    Tests/Imaging:     Vitamin D 19  JCV Ab unk      MRI Brain  9/2020 - high lesion burden with mix of lesions in the periventricular region, deep white matter, charu, T1 holes, gd-  1/2021 - no definite new lesions, gd-   12/2022 - no new lesions, gd-     MRI Cervical spine   9/2020 - multiple small eccentric cord lesions, quality of image not the best, gd-  1/2021 - no definite new lesions, gd-   12/2022 - no new lesions, gd-     MRI Thoracic spine   9/2020 - again quality not the best, it does appears that there are multiple small lesions, gd-   12/2022 -  No definite new lesions, gd-, lesions noted at T5-6, T6-7, and T 10-11    Assessment: 32-year-old woman with relapsing  remitting multiple sclerosis who has been off of disease modifying therapy.  She has a history of discrete relapses, but has residual symptoms related to her prior events.      Radiologic surveillance was stable.  I still think that she would benefit from a disease modifying therapy, but after discussing options she was not inclined to try medication at this time.  She is motivated to work on lifestyle changes.  She simply does not feel comfortable with the risks associated with disease modifying therapy given the benefits.  She is motivated to continue with radiologic surveillance.  She tolerated MRI much better with the assistance of Valium.    Plan:   -Blood work today for folate, vitamin D and ОЛЬГА virus  - Follow-up in 6 months    Note was completed with the assistance of Dragon Fluency software which can often result in accidental word substitutions.     A total of 20 minutes on the date of service were spent in the care of this patient.   Reena Ortega MD on 12/28/2022 at 9:41 AM          Again, thank you for allowing me to participate in the care of your patient.      Sincerely,    Reena Ortega MD

## 2022-12-28 NOTE — PROGRESS NOTES
Date of Service: 12/28/2022    [facility] Neurology   MS Clinic Evaluation    Subjective: 32-year-old woman who presents for evaluation of multiple sclerosis.    She presents today to discuss results of her recent testing.    She has not been contacted to schedule physical therapy.    Disease onset: age 27, urinary urgency, gait instability    DMD hx:   Copaxone     No Known Allergies    Current Outpatient Medications   Medication     diazepam (VALIUM) 5 MG tablet     vitamin D3 (CHOLECALCIFEROL) 50 mcg (2000 units) tablet     No current facility-administered medications for this visit.        Past medical, surgical, social and family history was personally reviewed. Pertinent details noted above.     Physical Examination:   /69 (BP Location: Left arm, Patient Position: Sitting, Cuff Size: Adult Regular)   Pulse 76   Wt 59.1 kg (130 lb 3.2 oz)   SpO2 98%   BMI 25.43 kg/m      General: no acute distress    Tests/Imaging:     Vitamin D 19  JCV Ab unk      MRI Brain  9/2020 - high lesion burden with mix of lesions in the periventricular region, deep white matter, charu, T1 holes, gd-  1/2021 - no definite new lesions, gd-   12/2022 - no new lesions, gd-     MRI Cervical spine   9/2020 - multiple small eccentric cord lesions, quality of image not the best, gd-  1/2021 - no definite new lesions, gd-   12/2022 - no new lesions, gd-     MRI Thoracic spine   9/2020 - again quality not the best, it does appears that there are multiple small lesions, gd-   12/2022 -  No definite new lesions, gd-, lesions noted at T5-6, T6-7, and T 10-11    Assessment: 32-year-old woman with relapsing remitting multiple sclerosis who has been off of disease modifying therapy.  She has a history of discrete relapses, but has residual symptoms related to her prior events.      Radiologic surveillance was stable.  I still think that she would benefit from a disease modifying therapy, but after discussing options she was not inclined to try  medication at this time.  She is motivated to work on lifestyle changes.  She simply does not feel comfortable with the risks associated with disease modifying therapy given the benefits.  She is motivated to continue with radiologic surveillance.  She tolerated MRI much better with the assistance of Valium.    Plan:   -Blood work today for folate, vitamin D and ОЛЬГА virus  - Follow-up in 6 months    Note was completed with the assistance of Dragon Fluency software which can often result in accidental word substitutions.     A total of 20 minutes on the date of service were spent in the care of this patient.   Reena Ortega MD on 12/28/2022 at 9:41 AM

## 2022-12-28 NOTE — PATIENT INSTRUCTIONS
Blood work today     Follow up in 6 months      Please monitor for the following symptoms:   - decreased vision from one eye, often associated with pain behind the eye  - double vision, often associated with walking difficulty  - numbness/tingling in an arm or leg that progresses over a few days  - weakness in an arm or leg    Symptoms of an MS relapse often progress over several hours or days.  People commonly experience intermittent numbness/tingling.  Only symptoms lasting more than 24 hours are concerning for a new relapse.

## 2022-12-30 ENCOUNTER — TELEPHONE (OUTPATIENT)
Dept: NEUROLOGY | Facility: CLINIC | Age: 32
End: 2022-12-30

## 2022-12-30 ENCOUNTER — PATIENT OUTREACH (OUTPATIENT)
Dept: CARE COORDINATION | Facility: CLINIC | Age: 32
End: 2022-12-30

## 2022-12-30 DIAGNOSIS — E55.9 VITAMIN D DEFICIENCY: Primary | ICD-10-CM

## 2022-12-30 LAB
C PNEUM IGG TITR SER IF: ABNORMAL {TITER}
C PSITTACI IGG TITR SER IF: ABNORMAL {TITER}
C TRACH IGG TITR SER IF: ABNORMAL {TITER}

## 2022-12-30 NOTE — PROGRESS NOTES
Clinic Care Coordination Contact    Situation: Patient chart reviewed by care coordinator.    Background: pt was noted on external discharge list as being at the ED.     Assessment: per notes pt was at the ED due to laceration on her 5th finger due to a fall.  She was treated and educated on what to watch for with concerns.      Plan/Recommendations: at this time no call will be made and CC will follow up as previously planned.     GABE Alegre  [facility] - Care Coordinator   12/30/2022   7:23 AM  [phone]

## 2023-01-01 LAB
C PNEUM IGM TITR SER IF: ABNORMAL {TITER}
C PSITTACI IGM TITR SER IF: ABNORMAL {TITER}
C TRACH IGM TITR SER IF: ABNORMAL {TITER}
MICROBIOLOGIST REVIEW: ABNORMAL

## 2023-01-04 LAB — SCANNED LAB RESULT: ABNORMAL

## 2023-01-08 ENCOUNTER — HEALTH MAINTENANCE LETTER (OUTPATIENT)
Age: 33
End: 2023-01-08

## 2023-01-24 ENCOUNTER — PATIENT OUTREACH (OUTPATIENT)
Dept: CARE COORDINATION | Facility: CLINIC | Age: 33
End: 2023-01-24

## 2023-01-24 ENCOUNTER — OFFICE VISIT (OUTPATIENT)
Dept: FAMILY MEDICINE | Facility: CLINIC | Age: 33
End: 2023-01-24
Payer: COMMERCIAL

## 2023-01-24 VITALS
RESPIRATION RATE: 16 BRPM | WEIGHT: 138 LBS | SYSTOLIC BLOOD PRESSURE: 106 MMHG | OXYGEN SATURATION: 98 % | HEART RATE: 78 BPM | DIASTOLIC BLOOD PRESSURE: 57 MMHG | TEMPERATURE: 97.6 F | BODY MASS INDEX: 27.09 KG/M2 | HEIGHT: 60 IN

## 2023-01-24 DIAGNOSIS — Z48.02 VISIT FOR SUTURE REMOVAL: Primary | ICD-10-CM

## 2023-01-24 PROCEDURE — 99213 OFFICE O/P EST LOW 20 MIN: CPT | Performed by: NURSE PRACTITIONER

## 2023-01-24 ASSESSMENT — PATIENT HEALTH QUESTIONNAIRE - PHQ9
SUM OF ALL RESPONSES TO PHQ QUESTIONS 1-9: 14
10. IF YOU CHECKED OFF ANY PROBLEMS, HOW DIFFICULT HAVE THESE PROBLEMS MADE IT FOR YOU TO DO YOUR WORK, TAKE CARE OF THINGS AT HOME, OR GET ALONG WITH OTHER PEOPLE: SOMEWHAT DIFFICULT
SUM OF ALL RESPONSES TO PHQ QUESTIONS 1-9: 14

## 2023-01-24 ASSESSMENT — PAIN SCALES - GENERAL: PAINLEVEL: NO PAIN (0)

## 2023-01-24 NOTE — NURSING NOTE
Ramonita Trujillo presents to the clinic for removal of sutures. The patient has had sutures in place for 25 days. There has been no patient reported signs or symptoms of infection or drainage. 1 suture and sutures,staples, staple, steri strips are seen and located on the inner right hand between pinky and ring finger. Tetanus status is up to date. All sutures were easily removed today. Routine wound care discussed by the RN or provider. The patient will follow up as needed.      Patient came in with removable sutures- inner finger suture was not dissolved yet writer was able to cut the knot and pull the suture out. The other 3 sutures have dissolved and writer cut the extra strings that were on the finger (unable to dissolve since on top of the skin). Provider Ulices looked at wound before discharging patient.      Patient had a total of 4 sutures- 1 removed and 3 dissolved.       Tanisha Melendrez RN on 1/24/2023 at 8:29 AM

## 2023-01-24 NOTE — PROGRESS NOTES
Assessment & Plan     Visit for suture removal  Here just shy of 4 weeks after suture placement. They are dissolvable sutures but have not dissolved. RN was able to remove 1 suture but other 2 could not be removed. They were trimmed and should dissolve or expel with time. Patient to follow up if no improvement in few weeks.                BMI:   Estimated body mass index is 26.95 kg/m  as calculated from the following:    Height as of this encounter: 1.524 m (5').    Weight as of this encounter: 62.6 kg (138 lb).       Depression Screening Follow Up    PHQ 1/24/2023   PHQ-9 Total Score 14   Q9: Thoughts of better off dead/self-harm past 2 weeks Not at all         Follow Up Actions Taken  Crisis resource information provided in After Visit Summary  Patient counseled, no additional follow up at this time.         Return in about 1 week (around 1/31/2023), or if symptoms worsen or fail to improve.    BRENT CESAR CNP  [facility] MARKY Shipman is a 32 year old, presenting for the following health issues:  Suture Removal (Right Hand)      History of Present Illness       Reason for visit:  Get stitches out  Symptom onset:  1-2 weeks ago  Symptom intensity:  Mild  Symptom progression:  Staying the same  Had these symptoms before:  No    She eats 0-1 servings of fruits and vegetables daily.She consumes 2 sweetened beverage(s) daily.She exercises with enough effort to increase her heart rate 9 or less minutes per day.  She exercises with enough effort to increase her heart rate 3 or less days per week.   She is taking medications regularly.    Today's PHQ-9         PHQ-9 Total Score: 14    PHQ-9 Q9 Thoughts of better off dead/self-harm past 2 weeks :   Not at all    How difficult have these problems made it for you to do your work, take care of things at home, or get along with other people: Somewhat difficult     Sutures placed at [facility] on 12/29. She was told they are  dissolvable and note say dissolvable but they are still in place  Wound has healed completele        Review of Systems   Constitutional, HEENT, cardiovascular, pulmonary, gi and gu systems are negative, except as otherwise noted.      Objective    /57 (BP Location: Left arm, Patient Position: Chair, Cuff Size: Adult Regular)   Pulse 78   Temp 97.6  F (36.4  C) (Tympanic)   Resp 16   Ht 1.524 m (5')   Wt 62.6 kg (138 lb)   SpO2 98%   BMI 26.95 kg/m    Body mass index is 26.95 kg/m .  Physical Exam   GENERAL: healthy, alert and no distress  SKIN: healed laceration between fingers with white sutures in place  PSYCH: mentation appears normal, affect normal/bright

## 2023-01-24 NOTE — PROGRESS NOTES
Clinic Care Coordination Contact    Brief call with patient at her sister's home, she would rather schedule a call.  She would like SW to call her Monday 1/30/2023 at 1 pm.  SW will add to her calendar and call her as requested     Valerie Chase, GABE, MSW   [facility]  Care Coordination  [facility]  [phone]  1/24/2023 2:28 PM

## 2023-01-24 NOTE — PATIENT INSTRUCTIONS
At [facility], we strive to deliver an exceptional experience to you, every time we see you. If you receive a survey, please complete it as we do value your feedback.  If you have MyChart, you can expect to receive results automatically within 24 hours of their completion.  Your provider will send a note interpreting your results as well.   If you do not have MyChart, you should receive your results in about a week by mail.    Your care team:                            Family Medicine Internal Medicine   MD Damir Mitchell MD Shantel Branch-Fleming, MD Srinivasa Vaka, MD Katya Belousova, PABRENT Silva CNP, MD ([facility]   Hoang Grullon, MD Trisha To MD Amelia Massimini APRN TORREY Manning APRN MD Lilibeth Schmitz MD          Clinic hours: Monday - Thursday 7 am-6 pm; Fridays 7 am-5 pm.   Urgent care: Monday - Friday 10 am- 8 pm; Saturday and Sunday 9 am-5 pm.    Clinic: [phone]       [facility]: Monday - Thursday 8 am - 7 pm; Friday 8 am - 6 pm  [facility]: [phone]

## 2023-02-09 ENCOUNTER — PATIENT OUTREACH (OUTPATIENT)
Dept: CARE COORDINATION | Facility: CLINIC | Age: 33
End: 2023-02-09
Payer: COMMERCIAL

## 2023-02-09 NOTE — LETTER
[facility] CARE COORDINATION  [address] [facility] [address]    February 17, 2023        Ramonita Trujillo  [address] [address]  [place] [address]          Dear Ramonita,     Suze is an updated Patient Centered Plan of Care for your continued enrollment in Care Coordination. Please let us know if you have additional questions, concerns, or goals that we can assist with.    Sincerely,    Valerie Chase, LSW, MSW Clinic   [facility]   [email or web address]  Office: [phone]  Employed by [employer] [facility] [employer]  Patient Centered Plan of Care  About Me:        Patient Name:  Ramonita Trujillo    YOB: 1990  Age:         32 year old   Nelly MRN:    [id number] Telephone Information:  Home Phone [phone]   Mobile [phone]       Address:  [address] [address]  [place] [address] Email address:  [email or web address]      Emergency Contact(s)    Name Relationship Lgl Grd Work Phone Home Phone Mobile Phone   1. MIRANDA HECK* Sister  [phone]           Primary language:  English     needed? No   [employer]:  [phone] op. 1  Other communication barriers:Physical impairment    Preferred Method of Communication:  Mail  Current living arrangement: Other ([facility] [place])    Mobility Status/ Medical Equipment: Independent        Health Maintenance  Health Maintenance Reviewed: Due/Overdue   Health Maintenance Due   Topic Date Due     ADVANCE CARE PLANNING  Never done     DEPRESSION ACTION PLAN  Never done     COVID-19 Vaccine (1) Never done     HEPATITIS B IMMUNIZATION (2 of 3 - 3-dose series) 10/01/2003     YEARLY PREVENTIVE VISIT  08/04/2021     PAP  08/04/2023         My Access Plan  Medical Emergency 911   Primary Clinic Line [facility]  [facility] [phone]   24 Hour Appointment Line [phone] or  [phone]) (toll-free)   24 Hour Nurse Line [phone] (toll-free)   Preferred Urgent Care [facility], [phone]     Preferred Hospital [facility]  [phone]     Preferred Pharmacy [id number] - [place], [place] - [address]     Behavioral Health Crisis Line The National Suicide Prevention Lifeline at 1-567.167.7626 or Text/Call 968             My Care Team Members  Patient Care Team       Relationship Specialty Notifications Start End    Adali Britton NP PCP - General Nurse Practitioner - Family  12/28/22     Phone: [phone] Fax: [phone]         [address] [facility] [address]    Reena Ortega MD Assigned Neuroscience Provider   11/12/22     Phone: [phone] Fax: [phone]         [address] [facility] [address]    Valerie Chase BSW Lead Care Coordinator Primary Care - CC Admissions 12/8/22      Care Coordination [facility]    Adali Britton NP Assigned PCP   12/31/22     Phone: [phone] Fax: [phone]         [address] [place] [address]            My Care Plans  Self Management and Treatment Plan  Care Plan  Care Plan: Mental Health     Problem: Limited support resources     Goal: Establish supportive resources and stable housing     Start Date: 12/8/2022 Expected End Date: 6/8/2023    This Visit's Progress: 30% Recent Progress: 20%    Priority: High    Note:     Barriers: Current moderate depressive disorder and CHUCK dx's.  Unable to work due to complications from health, on leave from employment and hopeful to return to a suitable position, limited social and emotional support, frequent suicidal deation but no plan/intent. Recently lost employment.  Current housing may be temporary and will need to secure permanent housing  sooner than later    Strengths: Motivated to complete paperwork, given 988# for emotional support resources, has current housing, discussed disability resources with SW.  Has psychologist  Patient expressed understanding of goal: yes  Action steps to achieve this goal:  1. I will take all medications as prescribed, I will attend all scheduled appts   2. I will seen out emotional support as needed  3. I will utilize 988# if needed for emotional support   4. I will reach out to SW and/or psychologist for further support/resources as needed                      Care Plan: Community Support     Problem: No current consistent income     Goal: I will research and inquire on disability process     Start Date: 12/8/2022 Expected End Date: 8/17/2023    This Visit's Progress: 30% Recent Progress: 20%    Priority: Medium    Note:     Barriers: No current consistent income, difficulty working due to limitations with MS, may lose temporary housing in the future and is difficult with current limitations    Strengths: ADA paperwork completed for potential return under disability restrictions, SW discussed disability resources long term and patient will call and inquire   Patient expressed understanding of goal: yes  Action steps to achieve this goal:  1. I will find time to continue to call/research disability and employment resources   2. I will discuss my current needs   3. I will call for consultation as desired   4. I will continue contact with current employment and discuss next steps                            Action Plans on File: none                      Advance Care Plans/Directives Type: none      My Medical and Care Information  Problem List   Patient Active Problem List   Diagnosis     CARDIOVASCULAR SCREENING; LDL GOAL LESS THAN 160     MS (multiple sclerosis) (H)        Care Coordination Start Date: 12/7/2022   Frequency of Care Coordination: monthly     Form Last Updated: 02/17/2023

## 2023-02-09 NOTE — PROGRESS NOTES
Clinic Care Coordination Contact  [facility]/Voicemail       Clinical Data: Care Coordinator Outreach  Outreach attempted x 2.  Left message on patient's voicemail with call back information and requested return call.  Plan: Care Coordinator will try to reach patient again in 3-5 business days.    GABE Waldron, MSW   [facility]  Care Coordination  [facility]  [phone]  2/9/2023 2:17 PM

## 2023-02-09 NOTE — PROGRESS NOTES
Clinic Care Coordination Contact  [facility]       Clinical Data: Care Coordinator Outreach  Outreach attempted x 1.  VM full   Plan: Care Coordinator will try to reach patient again in 3-5 business days.    GABE Waldron, MSW   [facility]  Care Coordination  [facility]  [phone]  2/9/2023 2:20 PM

## 2023-02-17 NOTE — PROGRESS NOTES
"Clinic Care Coordination Contact    Follow Up Progress Note      Assessment: Patient reports she has completed ADA paperwork as required for work, they will inform her when there is a suitable position to return to in the future.  Patient reports she is working with someone from [employer] [facility] [employer] to assist in job search, however has many limitations (cannot do repetitive work, cannot stand for long periods of time).  She reports the shelter has moved her to a hotel as \"they are refurbishing the shelter\".  She continues to search for housing, but very difficult with limited income and limitations.  She thanked SW for calling     Care Gaps:  Addressed briefly, patient is focused on multiple other needs, including housing and employment.  Will address on a future call     Health Maintenance Due   Topic Date Due     ADVANCE CARE PLANNING  Never done     DEPRESSION ACTION PLAN  Never done     COVID-19 Vaccine (1) Never done     HEPATITIS B IMMUNIZATION (2 of 3 - 3-dose series) 10/01/2003     YEARLY PREVENTIVE VISIT  08/04/2021     PAP  08/04/2023       Care Plans  Care Plan: Mental Health     Problem: Limited support resources     Goal: Establish supportive resources and stable housing     Start Date: 12/8/2022 Expected End Date: 6/8/2023    This Visit's Progress: 30% Recent Progress: 20%    Priority: High    Note:     Barriers: Current moderate depressive disorder and CHUCK dx's.  Unable to work due to complications from health, on leave from employment and hopeful to return to a suitable position, limited social and emotional support, frequent suicidal deation but no plan/intent. Recently lost employment.  Current housing may be temporary and will need to secure permanent housing sooner than later    Strengths: Motivated to complete paperwork, given 988# for emotional support resources, has current housing, discussed disability resources with [facility].  Has psychologist  Patient expressed understanding of goal: yes  Action steps to " achieve this goal:  1. I will take all medications as prescribed, I will attend all scheduled appts   2. I will seen out emotional support as needed  3. I will utilize 988# if needed for emotional support   4. I will reach out to SW and/or psychologist for further support/resources as needed                      Care Plan: Community Support     Problem: No current consistent income     Goal: I will research and inquire on disability process     Start Date: 12/8/2022 Expected End Date: 8/17/2023    This Visit's Progress: 30% Recent Progress: 20%    Priority: Medium    Note:     Barriers: No current consistent income, difficulty working due to limitations with MS, may lose temporary housing in the future and is difficult with current limitations    Strengths: ADA paperwork completed for potential return under disability restrictions, SW discussed disability resources long term and patient will call and inquire   Patient expressed understanding of goal: yes  Action steps to achieve this goal:  1. I will find time to continue to call/research disability and employment resources   2. I will discuss my current needs   3. I will call for consultation as desired   4. I will continue contact with current employment and discuss next steps                         Intervention/Education provided during outreach: none     Outreach Frequency: monthly      Plan:     Care Coordinator will follow up in 1 month     GABE Waldron, MSW   [facility]  Care Coordination  [facility]  [phone]  2/17/2023 1:01 PM

## 2023-03-27 ENCOUNTER — PATIENT OUTREACH (OUTPATIENT)
Dept: CARE COORDINATION | Facility: CLINIC | Age: 33
End: 2023-03-27
Payer: COMMERCIAL

## 2023-03-27 NOTE — PROGRESS NOTES
Clinic Care Coordination Contact  [facility]       Clinical Data: Care Coordinator Outreach  Outreach attempted x 1.  VM full   Plan: Care Coordinator will try to reach patient again in 1-2 business days.    GABE Waldron, MSW   [facility]  Care Coordination  [facility]  [phone]  3/27/2023 4:18 PM

## 2023-04-10 ENCOUNTER — PATIENT OUTREACH (OUTPATIENT)
Dept: CARE COORDINATION | Facility: CLINIC | Age: 33
End: 2023-04-10
Payer: COMMERCIAL

## 2023-04-10 NOTE — PROGRESS NOTES
"Clinic Care Coordination Contact    Follow Up Progress Note      Assessment: Call for update.  Patient reports she is \"doing pretty good\".  Patient reported being very hopeful she will obtain job after interview tomorrow.  She reports having lost her shelter stay but is living with sister currently.  She is hopeful this will be permanent pending job above.  Patient reports \"mood is just much better\" not living at the shelter.  Patient is not planning to pursue disability at present as does not feel confident she will qualify and would rather work if able.  Did not discuss care gaps as patient's focus is her housing and job interview tomorrow.  Will address in future call once more settled with housing.  SW did inform that CHW will be calling for update next month     Care Gaps:    Health Maintenance Due   Topic Date Due     ADVANCE CARE PLANNING  Never done     DEPRESSION ACTION PLAN  Never done     COVID-19 Vaccine (1) Never done     HEPATITIS B IMMUNIZATION (2 of 3 - 3-dose series) 10/01/2003     YEARLY PREVENTIVE VISIT  08/04/2021     PAP  08/04/2023       Care Plans  Care Plan: Mental Health     Problem: Limited support resources     Goal: Establish supportive resources and stable housing     Start Date: 12/8/2022 Expected End Date: 6/8/2023    This Visit's Progress: 40% Recent Progress: 30%    Priority: High    Note:     Barriers: Current moderate depressive disorder and CHUCK dx's.  Has been unable to work due to complications from health, on leave from employment and hopeful to return to a suitable position, limited social and emotional support, frequent suicidal deation but no plan/intent. Lost employment.  Recently lost shelter housing, staying with sister   Strengths: Given 988# for emotional support resources, has current housing with sister.  Has psychologist.  Has job interview tomorrow and is hopeful to obtain employment to allow her to stay with sister   Patient expressed understanding of goal: " yes  Action steps to achieve this goal:  1. I will take all medications as prescribed, I will attend all scheduled appts   2. I will seen out emotional support as needed  3. I will utilize 988# if needed for emotional support   4. I will reach out to SW and/or psychologist for further support/resources as needed    5. Mood is positive today as she is hopeful for obtains new job, she has interview tomorrow                     Care Plan: Community Support     Problem: No current consistent income                 Intervention/Education provided during outreach: none     Outreach Frequency: monthly    Plan:     CHW will call in 1 month for update, Care Coordinator will review progress to goals and plan of care in 6 weeks.    Valerie Chase, ANNMARIEW, MSW   [facility]  Care Coordination  [facility]  [phone]  4/10/2023 2:33 PM

## 2023-04-10 NOTE — PROGRESS NOTES
Please see new  encounter     GABE Waldron, MSW   [facility]  Care Coordination  [facility]  [phone]  4/10/2023 2:10 PM

## 2023-05-10 ENCOUNTER — PATIENT OUTREACH (OUTPATIENT)
Dept: CARE COORDINATION | Facility: CLINIC | Age: 33
End: 2023-05-10
Payer: COMMERCIAL

## 2023-05-10 NOTE — PROGRESS NOTES
Clinic Care Coordination Contact  [facility]    Clinical Data: Care Coordination Outreach  Outreach attempted x 1.  Patient's VM is currently full, CHW could lnot leave a message.  Plan: CHW will try to reach patient again in 5-7 business days.    CHW notes for next outreach:   Patient had recent job interview (permanent position)  Patient currently staying with sister, but actively looking for housing  Mood has been better per [facility] outreach in April.   Patient not planning to pursue disability and would rather work, if able. CHW to discuss care gaps     Fouzia COOLEY  Community Health Worker  [facility] Care Coordination  [facility]  [email or web address]   Office: [phone]

## 2023-05-18 ENCOUNTER — PATIENT OUTREACH (OUTPATIENT)
Dept: CARE COORDINATION | Facility: CLINIC | Age: 33
End: 2023-05-18
Payer: COMMERCIAL

## 2023-05-18 ASSESSMENT — ACTIVITIES OF DAILY LIVING (ADL): DEPENDENT_IADLS:: INDEPENDENT

## 2023-05-18 NOTE — LETTER
[facility] CARE COORDINATION    [address] [address]     May 18, 2023        Ramonita Trujillo  [address] [address]  [facility] [place] [address]          Dear Ramonita,     Suze is an updated Patient Centered Plan of Care for your continued enrollment in Care Coordination. Please let us know if you have additional questions, concerns, or goals that we can assist with.    Sincerely,    Valerie Chase, LSW, MSW Clinic   [facility]   [email or web address]  Office: [phone]  Employed by [employer]  Patient Centered Plan of Care  About Me:        Patient Name:  Ramonita Trujillo    YOB: 1990  Age:         33 year old   Nelly MRN:    [id number] Telephone Information:  Home Phone [phone]   Mobile [phone]       Address:  [address] [address]  [place] [place] [address] Email address:  [email or web address]      Emergency Contact(s)    Name Relationship Lgl Grd Work Phone Home Phone Mobile Phone   1. MIRANDA HECK* Sister  [phone]           Primary language:  English     needed? No   [employer]:  [phone] op. 1  Other communication barriers:Physical impairment; Lack of coping    Preferred Method of Communication:  Mail  Current living arrangement: Other (sister)    Mobility Status/ Medical Equipment: Independent        Health Maintenance  Health Maintenance Reviewed: Due/Overdue   Health Maintenance Due   Topic Date Due    ADVANCE CARE PLANNING  Never done    DEPRESSION ACTION PLAN  Never done    COVID-19 Vaccine (1) Never done    HEPATITIS B IMMUNIZATION (2 of 3 - 3-dose series) 10/01/2003    YEARLY PREVENTIVE VISIT  08/04/2021    PAP  08/04/2023          My Access Plan  Medical Emergency 911   Primary Clinic Line [facility]  [phone]   24 Hour Appointment Line [phone] or  [phone]) (toll-free)   24 Hour Nurse Line [phone] (toll-free)   Preferred Urgent Care [facility], [phone]       Preferred Hospital [facility]  [phone]       Preferred Pharmacy [id number] - [place], [place] - [address]     Behavioral Health Crisis Line The National Suicide Prevention Lifeline at 1-765.281.3593 or Text/Call 738             My Care Team Members  Patient Care Team         Relationship Specialty Notifications Start End    Adali Britton NP PCP - General Nurse Practitioner - Family  12/28/22     Phone: [phone] Fax: [phone]         [address] [address]    Reena Ortega MD Assigned Neuroscience Provider   11/12/22     Phone: [phone] Fax: [phone]         [address] [place] [address]    Valerie Chase BSW Lead Care Coordinator Primary Care - [facility] Admissions 12/8/22      Care Coordination [facility]    Adali Britton NP Assigned PCP   12/31/22     Phone: [phone] Fax: [phone]         [address] [place] [address]    Karen Abraham Community Health Worker Primary Care - [facility] Admissions 4/10/23     Phone: [phone] Fax: [phone]                  My Care Plans  Self Management and Treatment Plan  Care Plan  Care Plan: Mental Health       Problem: Limited support resources       Goal: Establish supportive resources and stable housing       Start Date: 12/8/2022 Expected End Date: 6/8/2023    This Visit's Progress: 40% Recent Progress: 30%    Priority: High    Note:     Barriers: Current moderate depressive disorder and CHUCK dx's.  Has been unable to work due to complications from health, on leave from employment and hopeful to return to a suitable position, limited social and emotional support, frequent  suicidal deation but no plan/intent. Lost employment.  Recently lost shelter housing, staying with sister   Strengths: Given 988# for emotional support resources, has current housing with sister.  Has psychologist.  Has job interview tomorrow and is hopeful to obtain employment to allow her to stay with sister   Patient expressed understanding of goal: yes  Action steps to achieve this goal:  1. I will take all medications as prescribed, I will attend all scheduled appts   2. I will seen out emotional support as needed  3. I will utilize 988# if needed for emotional support   4. I will reach out to SW and/or psychologist for further support/resources as needed    5. Mood is positive today as she is hopeful for obtains new job, she has interview tomorrow                             Care Plan: Community Support       Problem: No current consistent income                      Action Plans on File: none                  Advance Care Plans/Directives Type: none      My Medical and Care Information  Problem List   Patient Active Problem List   Diagnosis    CARDIOVASCULAR SCREENING; LDL GOAL LESS THAN 160    MS (multiple sclerosis) (H)          Care Coordination Start Date: 12/7/2022   Frequency of Care Coordination: monthly       Form Last Updated: 05/18/2023

## 2023-05-18 NOTE — PROGRESS NOTES
Clinic Care Coordination Contact    Situation: Patient chart reviewed by care coordinator.    Background: Patient is enrolled     Assessment: CHW attempting to reach patient for update     Plan/Recommendations: SW will review in 1-2 weeks, reviewed chart earlier than planned as sending care plan     GABE Waldron, MSW   [facility]  Care Coordination  [facility]  [phone]  5/18/2023 9:57 AM

## 2023-05-23 ENCOUNTER — PATIENT OUTREACH (OUTPATIENT)
Dept: CARE COORDINATION | Facility: CLINIC | Age: 33
End: 2023-05-23
Payer: COMMERCIAL

## 2023-05-23 NOTE — PROGRESS NOTES
Clinic Care Coordination Contact    Situation: Patient chart reviewed by care coordinator.    Background: Patient is enrolled     Assessment: CHW unable to reach patient on 5/10/2023    Plan/Recommendations: Patient is scheduled for follow up assessment 5/26/2023    GABE Waldron, MSW   [facility]  Care Coordination  [facility]  [phone]  5/23/2023 3:41 PM

## 2023-05-24 ENCOUNTER — PATIENT OUTREACH (OUTPATIENT)
Dept: CARE COORDINATION | Facility: CLINIC | Age: 33
End: 2023-05-24
Payer: COMMERCIAL

## 2023-05-24 NOTE — PROGRESS NOTES
Clinic Care Coordination Contact    Community Health Worker Follow Up    Care Gaps:     Health Maintenance Due   Topic Date Due     ADVANCE CARE PLANNING  Never done     DEPRESSION ACTION PLAN  Never done     COVID-19 Vaccine (1) Never done     HEPATITIS B IMMUNIZATION (2 of 3 - 3-dose series) 10/01/2003     YEARLY PREVENTIVE VISIT  08/04/2021     PAP  08/04/2023       Will schedule when she can get time off work.     Care Plan:   Care Plan: Mental Health     Problem: Limited support resources     Goal: Establish supportive resources and stable housing     Start Date: 12/8/2022 Expected End Date: 6/8/2023    This Visit's Progress: 50% Recent Progress: 40%    Priority: High    Note:     Barriers: Current moderate depressive disorder and CHUCK dx's.  Has been unable to work due to complications from health, on leave from employment and hopeful to return to a suitable position, limited social and emotional support, frequent suicidal deation but no plan/intent. Lost employment.  Recently lost shelter housing, staying with sister   Strengths: Given 988# for emotional support resources, has current housing with sister.  Has psychologist.  Has job interview tomorrow and is hopeful to obtain employment to allow her to stay with sister   Patient expressed understanding of goal: yes  Action steps to achieve this goal:  1. I will take all medications as prescribed, I will attend all scheduled appts   2. I will seen out emotional support as needed  3. I will utilize 988# if needed for emotional support   4. I will reach out to SW and/or psychologist for further support/resources as needed    5. Mood is positive today as she is hopeful for obtains new job, she has interview tomorrow                     Care Plan: Community Support     Problem: No current consistent income                 Intervention and Education during outreach: Patient states she has started a new job and has been there about a month. She is currently staying  with her sister but her sister is on subsidized housing and her inspection is coming up so she is having to go back to the shelter soon. She is waiting on a bed. Patient requested some more assistance in finding housing. CHW schedule patient for a phone visit with RIGO BHATT on 5/26 and 11 am.   Patient is working in [facility] and is open to finding housing in that area if the waiting list is shorter in [facility].      CHW Plan: CHW will continue to support patient with goals through routine scheduled outreach.     Next outreach due: 6/20/23

## 2023-05-26 ENCOUNTER — PATIENT OUTREACH (OUTPATIENT)
Dept: NURSING | Facility: CLINIC | Age: 33
End: 2023-05-26
Payer: COMMERCIAL

## 2023-05-26 ASSESSMENT — ACTIVITIES OF DAILY LIVING (ADL): DEPENDENT_IADLS:: INDEPENDENT

## 2023-05-26 NOTE — PROGRESS NOTES
Clinic Care Coordination Contact    Follow Up Progress Note      Assessment: Patient needs housing resources per CHW recent call.  SW discussed housing link web site (www.housinglink.org) [facility] housing as options.  Patient accepted this information.  Per CHW call, patient currently staying with sister, however sister is in subsidized housing and has upcoming inspection.  Patient in process of moving to previous shelter but there is a waiting list.  Patient has a new job of a month and it is going well thus far. Patient reports her mood has been very good.      Care Gaps:    Health Maintenance Due   Topic Date Due     ADVANCE CARE PLANNING  Never done     DEPRESSION ACTION PLAN  Never done     COVID-19 Vaccine (1) Never done     HEPATITIS B IMMUNIZATION (2 of 3 - 3-dose series) 10/01/2003     YEARLY PREVENTIVE VISIT  08/04/2021     PAP  08/04/2023     Did not discuss today as focus is housing     Care Plans  Care Plan: Mental Health     Problem: Limited support resources     Goal: Establish supportive resources and stable housing     Start Date: 12/8/2022 Expected End Date: 6/8/2023    Recent Progress: 50%    Priority: High    Note:     Barriers: Current moderate depressive disorder and CHUCK dx's.  Has been unable to work due to complications from health, on leave from employment and hopeful to return to a suitable position, limited social and emotional support, frequent suicidal deation but no plan/intent. Lost employment.  Recently lost shelter housing, staying with sister   Strengths: Given 988# for emotional support resources, has current housing with sister, looking for housing.  Has psychologist.  Has new job and going well   Patient expressed understanding of goal: yes  Action steps to achieve this goal:  1. I will take all medications as prescribed, I will attend all scheduled appts   2. I will seen out emotional support as needed  3. I will utilize 988# if needed for emotional support   4. I will reach  out to SW and/or psychologist for further support/resources as needed    5. Mood is positive today as she is hopeful for independent housing                     Care Plan: Community Support     Problem: No current consistent income                 Intervention/Education provided during outreach: [employer] and housing link      Outreach Frequency: monthly    Plan:     CHW will call in 1 month for update, Care Coordinator will review progress to goals and plan of care in 6 weeks    Valerie Chase, ANNMARIEW, MSW   [facility]  Care Coordination  [facility]  [phone]  5/26/2023 11:14 AM

## 2023-06-28 ENCOUNTER — OFFICE VISIT (OUTPATIENT)
Dept: NEUROLOGY | Facility: CLINIC | Age: 33
End: 2023-06-28
Attending: PSYCHIATRY & NEUROLOGY
Payer: COMMERCIAL

## 2023-06-28 ENCOUNTER — LAB (OUTPATIENT)
Dept: LAB | Facility: CLINIC | Age: 33
End: 2023-06-28
Payer: COMMERCIAL

## 2023-06-28 VITALS
DIASTOLIC BLOOD PRESSURE: 60 MMHG | HEART RATE: 75 BPM | OXYGEN SATURATION: 99 % | SYSTOLIC BLOOD PRESSURE: 95 MMHG | BODY MASS INDEX: 23.57 KG/M2 | WEIGHT: 120.7 LBS

## 2023-06-28 DIAGNOSIS — R26.0 ATAXIC GAIT: ICD-10-CM

## 2023-06-28 DIAGNOSIS — G35 MS (MULTIPLE SCLEROSIS) (H): Primary | ICD-10-CM

## 2023-06-28 LAB
FOLATE SERPL-MCNC: 10.7 NG/ML (ref 4.6–34.8)
VIT B12 SERPL-MCNC: 873 PG/ML (ref 232–1245)

## 2023-06-28 PROCEDURE — 99215 OFFICE O/P EST HI 40 MIN: CPT | Performed by: PSYCHIATRY & NEUROLOGY

## 2023-06-28 PROCEDURE — 99000 SPECIMEN HANDLING OFFICE-LAB: CPT | Performed by: PATHOLOGY

## 2023-06-28 PROCEDURE — 36415 COLL VENOUS BLD VENIPUNCTURE: CPT | Performed by: PATHOLOGY

## 2023-06-28 PROCEDURE — 82607 VITAMIN B-12: CPT | Performed by: PSYCHIATRY & NEUROLOGY

## 2023-06-28 PROCEDURE — G0463 HOSPITAL OUTPT CLINIC VISIT: HCPCS | Performed by: PSYCHIATRY & NEUROLOGY

## 2023-06-28 PROCEDURE — 82746 ASSAY OF FOLIC ACID SERUM: CPT | Performed by: PSYCHIATRY & NEUROLOGY

## 2023-06-28 RX ORDER — DIAZEPAM 5 MG
TABLET ORAL
Qty: 3 TABLET | Refills: 0 | Status: SHIPPED | OUTPATIENT
Start: 2023-12-01

## 2023-06-28 ASSESSMENT — PAIN SCALES - GENERAL: PAINLEVEL: NO PAIN (0)

## 2023-06-28 NOTE — LETTER
2023      Re: Ramonita Trujillo   2023       To whom it may concern:     Ms. Trujillo is under my care for a chronic medical condition that causes imbalance and weakness. She can only do light duty around the shelter.  More intense duties will put her at very high risk for fall and injury.     Sincerely,       Reena Ortega MD
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
## 2023-06-28 NOTE — PATIENT INSTRUCTIONS
Your exam is generally stable  Balance is a bit worse and sensation in your feet  This can be expected with progression     Blood work today     Mri in 6 months     No driving - you are at high risk for accident   [employer] mobility is a good option    I will support a disability claim     Follow up in 6 months

## 2023-06-28 NOTE — NURSING NOTE
Chief Complaint   Patient presents with     MS     RECHECK     6 month follow up      Vitals were taken and medications were reconciled.   Kei Herrera, EMT  8:15 AM
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
## 2023-06-29 ENCOUNTER — PATIENT OUTREACH (OUTPATIENT)
Dept: CARE COORDINATION | Facility: CLINIC | Age: 33
End: 2023-06-29
Payer: COMMERCIAL

## 2023-06-29 NOTE — PROGRESS NOTES
Clinic Care Coordination Contact    Community Health Worker Follow Up    Care Gaps:     Health Maintenance Due   Topic Date Due     ADVANCE CARE PLANNING  Never done     DEPRESSION ACTION PLAN  Never done     COVID-19 Vaccine (1) Never done     HEPATITIS B IMMUNIZATION (2 of 3 - 3-dose series) 10/01/2003     YEARLY PREVENTIVE VISIT  08/04/2021     PHQ-9  07/24/2023     PAP  08/04/2023       Patient will address on her time.    Care Plan:   Care Plan: Mental Health     Problem: Limited support resources     Goal: Establish supportive resources and stable housing     Start Date: 12/8/2022 Expected End Date: 6/8/2023    This Visit's Progress: 60% Recent Progress: 50%    Priority: High    Note:     Barriers: Current moderate depressive disorder and CHUCK dx's.  Has been unable to work due to complications from health, on leave from employment and hopeful to return to a suitable position, limited social and emotional support, frequent suicidal deation but no plan/intent. Lost employment.  Recently lost shelter housing, staying with sister   Strengths: Given 988# for emotional support resources, has current housing with sister, looking for housing.  Has psychologist.  Has new job and going well   Patient expressed understanding of goal: yes  Action steps to achieve this goal:  1. I will take all medications as prescribed, I will attend all scheduled appts   2. I will seen out emotional support as needed  3. I will utilize 988# if needed for emotional support   4. I will reach out to SW and/or psychologist for further support/resources as needed    5. Mood is positive today as she is hopeful for independent housing                     Care Plan: Community Support     Problem: No current consistent income                 Intervention and Education during outreach: Patient is no longer staying with her sister and is at a shelter, she has her own room. She is currently not working as it is too physically demanding for her. She  is hoping that the worker she is meeting with at the shelter next week can help her with the disability application.     Patient has completed a [employer] application when she was in clinic this week.     Patient would like to apply for other county assistance again since her financial situation has change dramatically. Order placed for FRW.     CHW Plan: CHW will continue to support patient with goals through routine scheduled outreach.     Next outreach due: 7/31/23

## 2023-06-30 ENCOUNTER — PATIENT OUTREACH (OUTPATIENT)
Dept: CARE COORDINATION | Facility: CLINIC | Age: 33
End: 2023-06-30
Payer: COMMERCIAL

## 2023-06-30 NOTE — PROGRESS NOTES
Clinic Care Coordination Contact  Program:  County:  [facility] Case #:  [facility] Worker:   Gal #:   Subscriber #:   Renewal:  Date Applied:     FRW Outreach:   23 FRW called and completed online application and will follow up in 30 days    SNAP/CASH Application Screenin. Have you had Count includes the [facility] benefits before? Yes   2. How many people in the household, do you eat/buy food together? 1   3. What is your monthly income (include all tax members)? 0  4. Do you have a bank account?   5. Do you have any utility bills (electricity, rent, mortgage, phone, insurance, medical bills, etc.)?    6. Do you have social security cards and/or green cards?  Yes     [id number] [address] [place] [address]  [id number]   Health Insurance:      Referral/Screening:  FRW Screening    Row Name [phone]       [facility] Benefits   Is patient requesting help applying for Count includes the [facility] benefits? Yes       Have you recently applied for any [place] includes the [facility] benefits? No       How many people in your household? 1       Do you buy/eat food together? No       What is the monthly gross income for the household (wages, social security, workers comp, and pension)?  0       [place]:   [place] No       Is this an insurance renewal? No       Is this a new insurance application request? No       OTHER   Is this a [employer] care application? No       Any other information for the FRW? Patient is currently not working and living in a shelter

## 2023-07-03 ENCOUNTER — DOCUMENTATION ONLY (OUTPATIENT)
Dept: NEUROLOGY | Facility: CLINIC | Age: 33
End: 2023-07-03
Payer: COMMERCIAL

## 2023-07-03 NOTE — PROGRESS NOTES
[employer] forms placed in outgoing mail, to go to [facility].    Cintia Tomlinson RN

## 2023-07-06 ENCOUNTER — PATIENT OUTREACH (OUTPATIENT)
Dept: CARE COORDINATION | Facility: CLINIC | Age: 33
End: 2023-07-06
Payer: COMMERCIAL

## 2023-07-06 NOTE — PROGRESS NOTES
Clinic Care Coordination Contact  Care Coordination Clinician Chart Review    Situation: Patient chart reviewed by Care Coordinator.       Background: Care Coordination Program started: 12/7/2022. Initial assessment completed and patient-centered care plan(s) were developed with participation from patient. Lead CC handed patient off to CHW for continued outreaches.       Assessment: Per chart review, patient outreach completed by CC CHW on 6/29/2023.  Per CHW call, patient will address care gaps at her own pace.  She has completed [employer] application recently.  She is planning to apply for disability, is hopeful shelter worker can assist her.  She cannot currently work as too physically demanding.  She currently has own room at shelter.  Patient reported her income has dramatically changed, FRW referral made.  FRW has contacted patient and assisted with application for cash and SNAP benefits.  Patient is actively working to accomplish goal(s). Patient's goal(s) appropriate and relevant at this time. Patient is not due for updated Plan of Care.  Assessments will be completed annually or as needed/with change of patient status.      Care Plan: Mental Health     Problem: Limited support resources     Goal: Establish supportive resources and stable housing     Start Date: 12/8/2022 Expected End Date: 1/8/2024    This Visit's Progress: 60% Recent Progress: 60%    Priority: High    Note:     Barriers: Current moderate depressive disorder and CHUCK dx's. Unable to work due to complications from health, limited social and emotional support, frequent suicidal deation but no plan/intent.     Strengths: Given 988# for emotional support resources, has current shelter housing. Has psychologist.  Recently completed [employer] application and plans to apply for disability   Patient expressed understanding of goal: yes  Action steps to achieve this goal:  1. I will take all medications as prescribed, I will attend all  scheduled appts   2. I will seen out emotional support as needed  3. I will utilize 988# if needed for emotional support   4. I will reach out to SW and/or psychologist for further support/resources as needed    5. Mood is positive today as she is hopeful for independent housing                     Care Plan: Community Support     Problem: No current consistent income                    Plan/Recommendations: The patient will continue working with Care Coordination to achieve goal(s) as above. CHW will continue outreaches at minimum every 30 days and will involve Lead CC as needed or if patient is ready to move to Maintenance. Lead CC will continue to monitor CHW outreaches and patient's progress to goal(s) every 6 weeks.     Plan of Care updated and sent to patient: No    GABE Waldron, MSW   [facility]  Care Coordination  [facility]  [phone]  7/6/2023 8:57 AM

## 2023-07-12 ENCOUNTER — MYC MEDICAL ADVICE (OUTPATIENT)
Dept: NEUROLOGY | Facility: CLINIC | Age: 33
End: 2023-07-12
Payer: COMMERCIAL

## 2023-07-14 ENCOUNTER — PATIENT OUTREACH (OUTPATIENT)
Dept: CARE COORDINATION | Facility: CLINIC | Age: 33
End: 2023-07-14
Payer: COMMERCIAL

## 2023-07-14 NOTE — PROGRESS NOTES
Clinic Care Coordination Contact    Patient requested return call, SW returned call today.  Per chart review, patient has requested a letter from neurology stating her difficulty with physically working but that she can work remotely/at home.  She is currently being denied unemployment as they believe she can work.  Patient questions if she receives unemployment benefits if this will affect her application for SSDI.  SW encouraged her to call [employer] to discuss specific disability questions.  Patient reports she has phone appt with [employer] in a couple weeks for assistance with disability application.      CHW will call patient as scheduled for update     GABE Waldron, MSW   [facility]  Care Coordination  [facility]  [phone]  7/14/2023 1:04 PM

## 2023-07-20 ENCOUNTER — DOCUMENTATION ONLY (OUTPATIENT)
Dept: NEUROLOGY | Facility: CLINIC | Age: 33
End: 2023-07-20
Payer: COMMERCIAL

## 2023-07-20 NOTE — PROGRESS NOTES
Request for medical opinion received from Department of Human Services. Forms placed in Dr. Ortega's folder for review and signature.   Kei REESE 07/20/2023 1:21PM

## 2023-07-25 NOTE — PROGRESS NOTES
Medical opinion forms have been signed and faxed back to Department of human services at [phone]   Kei REESE 07/25/2023 2:26PM

## 2023-07-28 ENCOUNTER — PATIENT OUTREACH (OUTPATIENT)
Dept: CARE COORDINATION | Facility: CLINIC | Age: 33
End: 2023-07-28
Payer: COMMERCIAL

## 2023-07-28 NOTE — PROGRESS NOTES
Clinic Care Coordination Contact  Program:  County:  [facility] Case #:  [facility] Worker:   Gal #:   Subscriber #:   Renewal:  Date Applied:     FRW Outreach:   23 FRW called and patient stated that she sent in all documentation that he county needed last week FRW will follow up in 30 days   Nevaeh Huston   Financial Resource Worker  [facility]  Clinic Care Coordination  [phone]    23 FRW called and completed online application and will follow up in 30 days    SNAP/CASH Application Screenin. Have you had [facility] benefits before? Yes   2. How many people in the household, do you eat/buy food together? 1   3. What is your monthly income (include all tax members)? 0  4. Do you have a bank account?   5. Do you have any utility bills (electricity, rent, mortgage, phone, insurance, medical bills, etc.)?    6. Do you have social security cards and/or green cards?  Yes     [address] [address] [facility]  [id number]   Health Insurance:      Referral/Screening:  FRW Screening    Row Name [phone]       [facility] Benefits   Is patient requesting help applying for [facility] benefits? Yes       Have you recently applied for any [facility] benefits? No       How many people in your household? 1       Do you buy/eat food together? No       What is the monthly gross income for the household (wages, social security, workers comp, and pension)?  0       [place]:   [place]? No       Is this an insurance renewal? No       Is this a new insurance application request? No       OTHER   Is this a [employer] care application? No       Any other information for the FRW? Patient is currently not working and living in a shelter

## 2023-08-07 ENCOUNTER — PATIENT OUTREACH (OUTPATIENT)
Dept: CARE COORDINATION | Facility: CLINIC | Age: 33
End: 2023-08-07
Payer: COMMERCIAL

## 2023-08-07 NOTE — PROGRESS NOTES
[facility]       Clinical Data: Care Coordinator Outreach  Outreach attempted x 1. Call would not go through x 2 attempts   Plan:   Care Coordinator will try to reach patient again in 3-5 business days.    Next outreach due: 8/11/23

## 2023-08-15 ENCOUNTER — PATIENT OUTREACH (OUTPATIENT)
Dept: CARE COORDINATION | Facility: CLINIC | Age: 33
End: 2023-08-15
Payer: COMMERCIAL

## 2023-08-15 NOTE — LETTER
[facility] CARE COORDINATION     August 15, 2023        Ramonita Trujillo  [address] [address]  [place] [facility] [address]          Dear Ramonita,     Attached is an updated Patient Centered Plan of Care for your continued enrollment in Care Coordination. Please let us know if you have additional questions, concerns, or goals that we can assist with.    Sincerely,    Valerie Chase, ANNMARIEW, MSW Clinic   [facility]  Care Coordination  [facility]   [email or web address]  Office: [phone]  Employed by [employer] [facility] [employer] [facility] [employer]  Patient Centered Plan of Care  About Me:        Patient Name:  Ramonita Trujillo    YOB: 1990  Age:         33 year old   Nelly MRN:    [id number] Telephone Information:  Home Phone [phone]   Mobile [phone]       Address:  [address] [address]  [place] [facility] [address] Email address:  [email or web address]      Emergency Contact(s)            Primary language:  English     needed? No   [place] [facility]:  [phone] op. 1  Other communication barriers:Physical impairment; Lack of coping    Preferred Method of Communication:  Mail  Current living arrangement: Other (sister's home)    Mobility Status/ Medical Equipment: Independent        Health Maintenance  Health Maintenance Reviewed: Due/Overdue   Health Maintenance Due   Topic Date Due    ADVANCE CARE PLANNING  Never done    DEPRESSION ACTION PLAN  Never done    COVID-19 Vaccine (1) Never done    HEPATITIS B IMMUNIZATION (2 of 3 - 3-dose series) 10/01/2003    YEARLY PREVENTIVE VISIT  08/04/2021    PHQ-9  07/24/2023    PAP  08/04/2023          My Access Plan  Medical Emergency 911   Primary Clinic Line     24 Hour Appointment Line [phone] or  [phone] ([phone]) (toll-free)   24 Hour Nurse Line [phone] (toll-free)   Preferred Urgent Care [facility]  [place], [phone]     Preferred [facility]  [phone]     Preferred Pharmacy [id number] - [place], [place] - [address]     Behavioral Health Crisis Line The National Suicide Prevention Lifeline at 1-403.664.9156 or Text/Call 988             My Care Team Members  Patient Care Team         Relationship Specialty Notifications Start End    Reena Ortega MD Assigned Neuroscience Provider   11/12/22     Phone: [phone] Fax: [phone]         [address] [facility] [address]    Valerie Chase BSW Lead Care Coordinator Primary Care - CC Admissions 12/8/22      Care Coordination [facility] [address]    Karen Abraham Community Health Worker Primary Care - CC Admissions 4/10/23     Phone: [phone] Fax: [phone]        Nevaeh Huston MA Financial Resource Worker   6/29/23     Phone: [phone]         Adali Britton NP Assigned PCP   7/29/23     Phone: [phone] Fax: [phone]         [address] [facility] [address]              My Care Plans  Self Management and Treatment Plan  Care Plan  Care Plan: Mental Health       Problem: Limited support resources       Goal: Establish supportive resources and stable housing       Start Date: 12/8/2022 Expected End Date: 1/8/2024    This Visit's Progress: 60% Recent Progress: 60%    Priority: High    Note:     Barriers: Current moderate depressive disorder and CHUCK dx's. Unable to work due to complications from health, limited social and emotional support, frequent suicidal deation but no plan/intent.     Strengths: Given 988# for emotional support resources, has current shelter housing. Has psychologist.  Recently completed [employer] application and plans to apply for disability   Patient expressed understanding of goal: yes  Action steps to achieve this goal:  1. I will take all medications as prescribed, I will attend  all scheduled appts   2. I will seen out emotional support as needed  3. I will utilize 988 if needed for emotional support   4. I will reach out to SW and/or psychologist for further support/resources as needed    5. Mood is positive today as she is hopeful for independent housing                             Care Plan: Community Support       Problem: No current consistent income                      Action Plans on File: none                      Advance Care Plans/Directives Type: none  \    My Medical and Care Information  Problem List   Patient Active Problem List   Diagnosis    CARDIOVASCULAR SCREENING; LDL GOAL LESS THAN 160    MS (multiple sclerosis) (H)          Care Coordination Start Date: 12/7/2022   Frequency of Care Coordination: monthly     Form Last Updated: 08/15/2023

## 2023-08-16 ENCOUNTER — PATIENT OUTREACH (OUTPATIENT)
Dept: CARE COORDINATION | Facility: CLINIC | Age: 33
End: 2023-08-16
Payer: COMMERCIAL

## 2023-08-16 NOTE — PROGRESS NOTES
Clinic Care Coordination Contact  Program:[facility]  County:[facility] Case #:  County Worker:   Gal #:   Subscriber #:   Renewal:  Date Applied:     FRW Outreach:   23  FRW called patient and left a vm with call back information. FRW will make outreach in one week.  Nevaeh Huston   Financial Resource Worker  [facility] Care Coordination  [phone]    23 FRW called and patient stated that she sent in all documentation that he county needed last week FRW will follow up in 30 days   Nevaeh Huston   Financial Resource Worker  [facility] Care Coordination  [phone]    23 FRW called and completed online application and will follow up in 30 days    SNAP/CASH Application Screenin. Have you had [facility] benefits before? Yes   2. How many people in the household, do you eat/buy food together? 1   3. What is your monthly income (include all tax members)? 0  4. Do you have a bank account?   5. Do you have any utility bills (electricity, rent, mortgage, phone, insurance, medical bills, etc.)?    6. Do you have social security cards and/or green cards?  Yes     [address] [address]  [address]   Health Insurance:      Referral/Screening:  FRW Screening    Row Name 23 1014       [facility] Benefits   Is patient requesting help applying for [facility] benefits? Yes       Have you recently applied for any [facility] benefits? No       How many people in your household? 1       Do you buy/eat food together? No       What is the monthly gross income for the household (wages, social security, workers comp, and pension)?  0       [place]:   [place] No       Is this an insurance renewal? No       Is this a new insurance application request? No       OTHER   Is this a [place] care application? No       Any other information for the FRW? Patient is currently not working and living in a shelter

## 2023-08-16 NOTE — PROGRESS NOTES
Clinic Care Coordination Contact    Situation: Patient chart reviewed by care coordinator.    Background: Patient is enrolled     Assessment: CHW attempting to reach patient for update    Plan/Recommendations: SW will review in 1-2 weeks     GABE Waldron, MSW   [facility]  Care Coordination  [facility]  [phone]  8/15/2023 6:28 PM

## 2023-08-17 ENCOUNTER — PATIENT OUTREACH (OUTPATIENT)
Dept: CARE COORDINATION | Facility: CLINIC | Age: 33
End: 2023-08-17
Payer: COMMERCIAL

## 2023-08-17 NOTE — PROGRESS NOTES
[facility]       Clinical Data: Care Coordinator Outreach  Outreach attempted x 2.  Call does not go through.  Plan: Care Coordinator will send unable to contact letter with care coordinator contact information via [employer]. Care Coordinator will try to reach patient again in 10 business days.    Next outreach due: 8/31/23

## 2023-08-17 NOTE — LETTER
[facility] CARE COORDINATION  [address] [facility] [address]    August 17, 2023    Ramonita Trujillo  [address]  [place] [address]      Dear Ramonita,    I have been attempting to reach you since our last contact. I would like to continue to work with you and provide any additional support you may need on achieving your health care related goals. I would appreciate if you would give me a call at [phone] to let me know if you would like to continue working together. I know that there are many things that can affect our ability to communicate and I hope we can continue to work together.    All of us at the [facility] are invested in your health and are here to assist you in meeting your goals.     Sincerely,    Karen Abraham  **Call doesn't go through

## 2023-08-23 ENCOUNTER — PATIENT OUTREACH (OUTPATIENT)
Dept: CARE COORDINATION | Facility: CLINIC | Age: 33
End: 2023-08-23
Payer: COMMERCIAL

## 2023-08-23 NOTE — PROGRESS NOTES
Clinic Care Coordination Contact  Program:[facility]  County:[facility] Case #:  County Worker:   Gal #:   Subscriber #:   Renewal:  Date Applied:     FRW Outreach:   23 FRW called patient and left a final vm with call back information as attempt x2 with no answer or return phone calls. FRW closed the FRW program and remove patient from panel. Patient can be referred back to FRW if needed.      Nevaeh Huston   Financial Resource Worker  [facility] Care Coordination  [phone]    23  FRW called patient and left a vm with call back information. FRW will make outreach in one week.  Nevaeh Huston   Financial Resource Worker  [facility] Care Coordination  [phone]    23 FRW called and patient stated that she sent in all documentation that he county needed last week FRW will follow up in 30 days   Nevaeh Huston   Financial Resource Worker  [facility] Care Coordination  [phone]    23 FRW called and completed online application and will follow up in 30 days    SNAP/CASH Application Screenin. Have you had [facility] benefits before? Yes   2. How many people in the household, do you eat/buy food together? 1   3. What is your monthly income (include all tax members)? 0  4. Do you have a bank account?   5. Do you have any utility bills (electricity, rent, mortgage, phone, insurance, medical bills, etc.)?    6. Do you have social security cards and/or green cards?  Yes     [address] [address] [place] [address]  [address]   Health Insurance:      Referral/Screening:  FRW Screening    Row Name 23 1014       County Benefits   Is patient requesting help applying for [facility] benefits? Yes       Have you recently applied for any [facility] benefits? No       How many people in your household? 1       Do you buy/eat food together? No       What is the monthly gross income for the household (wages, social security, workers comp, and  pension)?  0       [place]:   [place] No       Is this an insurance renewal? No       Is this a new insurance application request? No       OTHER   Is this a [place] care application? No       Any other information for the FRW? Patient is currently not working and living in a shelter

## 2023-08-24 ENCOUNTER — PATIENT OUTREACH (OUTPATIENT)
Dept: CARE COORDINATION | Facility: CLINIC | Age: 33
End: 2023-08-24
Payer: COMMERCIAL

## 2023-08-24 NOTE — PROGRESS NOTES
Clinic Care Coordination Contact  Care Coordination Clinician Chart Review    Situation: Patient chart reviewed by Care Coordinator.       Background: Care Coordination Program started: 12/7/2022. Initial assessment completed and patient-centered care plan(s) were developed with participation from patient. Lead CC handed patient off to CHW for continued outreaches.       Assessment: Per chart review, patient outreach completed by CC CHW on 8-17-23.  CHW unable to connect with pt X2 on that day and will outreach to pt again by 8-31-23 regarding pt's progress toward goals.  Patient is not due for updated Plan of Care.  Assessments will be completed annually or as needed/with change of patient status.      Care Plan: Mental Health       Problem: Limited support resources       Goal: Establish supportive resources and stable housing       Start Date: 12/8/2022 Expected End Date: 1/8/2024    This Visit's Progress: 60% Recent Progress: 60%    Priority: High    Note:     Barriers: Current moderate depressive disorder and CHUCK dx's. Unable to work due to complications from health, limited social and emotional support, frequent suicidal deation but no plan/intent.     Strengths: Given 988# for emotional support resources, has current shelter housing. Has psychologist.  Recently completed [employer] application and plans to apply for disability   Patient expressed understanding of goal: yes  Action steps to achieve this goal:  1. I will take all medications as prescribed, I will attend all scheduled appts   2. I will seen out emotional support as needed  3. I will utilize 988# if needed for emotional support   4. I will reach out to SW and/or psychologist for further support/resources as needed    5. Mood is positive today as she is hopeful for independent housing                             Care Plan: Community Support       Problem: No current consistent income                        Plan/Recommendations: The patient  will continue working with Care Coordination to achieve goal(s) as above. CHW will continue outreaches at minimum every 30 days and will involve Lead CC as needed or if patient is ready to move to Maintenance. Lead CC will continue to monitor CHW outreaches and patient's progress to goal(s) every 6 weeks.     Plan of Care updated and sent to patient: No, not due    Krystle Mendez, for [facility]  [facility] Care Coordination  [phone]

## 2023-08-31 ENCOUNTER — PATIENT OUTREACH (OUTPATIENT)
Dept: CARE COORDINATION | Facility: CLINIC | Age: 33
End: 2023-08-31
Payer: COMMERCIAL

## 2023-08-31 NOTE — PROGRESS NOTES
[facility]       Clinical Data: Care Coordinator Outreach  Outreach attempted x 3.  VM full, unable to leave a message.  Plan: CHW will request chart review to dis-enroll due to unable to contact.   Care Coordinator will do no further outreaches at this time.    Karen Abraham, Modesta Eaton, Scottie Moore [facility] and [facility]  [phone]

## 2023-09-01 ENCOUNTER — PATIENT OUTREACH (OUTPATIENT)
Dept: CARE COORDINATION | Facility: CLINIC | Age: 33
End: 2023-09-01
Payer: COMMERCIAL

## 2023-09-01 ASSESSMENT — ACTIVITIES OF DAILY LIVING (ADL): DEPENDENT_IADLS:: INDEPENDENT

## 2023-09-01 NOTE — PROGRESS NOTES
Clinic Care Coordination Contact    Situation: Patient chart reviewed by care coordinator.    Background: Patient is enrolled     Assessment: CHW has been unable to reach patient x3    Plan/Recommendations: SW will close to CC, will update PCP     GABE Waldron, MSW   [facility]  Care Coordination  [facility]  [phone]  9/1/2023 8:20 AM

## 2023-09-01 NOTE — LETTER
[facility] CARE COORDINATION  [address] [facility] [address]   September 1, 2023    Ramonita Trujillo  [address]  [place] [address]      Dear Ramonita,    I have been unsuccessful in reaching you since our last contact. At this time the Care Coordination team will make no further attempts to reach you, however this does not change your ability to continue receiving care from your providers at your primary care clinic. If you need additional support from a care coordinator in the future please contact Valerie Chase at [phone]    [facility] is invested in your health and are here to assist you in meeting your goals.     Sincerely,    Valerie Chase, ANNMARIEW, MSW Clinic   [facility]   [email or web address]  Office: [phone]  Employed by [facility] [employer]

## 2023-11-02 ENCOUNTER — THERAPY VISIT (OUTPATIENT)
Dept: OCCUPATIONAL THERAPY | Facility: CLINIC | Age: 33
End: 2023-11-02
Attending: PSYCHIATRY & NEUROLOGY
Payer: COMMERCIAL

## 2023-11-02 DIAGNOSIS — Z78.9 IMPAIRED MOBILITY AND ADLS: Primary | ICD-10-CM

## 2023-11-02 DIAGNOSIS — G35 MULTIPLE SCLEROSIS (H): ICD-10-CM

## 2023-11-02 DIAGNOSIS — G35 MS (MULTIPLE SCLEROSIS) (H): ICD-10-CM

## 2023-11-02 DIAGNOSIS — Z74.09 IMPAIRED MOBILITY AND ADLS: Primary | ICD-10-CM

## 2023-11-02 PROCEDURE — 97542 WHEELCHAIR MNGMENT TRAINING: CPT | Mod: GO | Performed by: OCCUPATIONAL THERAPIST

## 2023-11-02 NOTE — PROGRESS NOTES
SEATING AND WHEELED MOBILITY ASSESSMENT    [facility]  Occupational Therapy     Date of service: 2023    Referring provider: Reena Ortega MD   Order Date 23  Onset Date: 23    Order Details: maribel francisco    Funding: [employer]    Vendor: Naa HAGAN from [employer]    Height/Weight: 5 / 120    Medical diagnosis:   Multiple sclerosis    Patient concerns/goals: mobility assistance    Living environment:  Accessible shelter    Living environment barriers:  None      Current assistance/living environment:  Lives alone  Shelter currently and looking for consistent housing    Community Mobility:  Transportation: Transportation Services, Medical rides , rides from friends  Community Mobility Requirements: Medical Appointments, Shopping    Current mobility equipment:  Loaned manual chair from [facility][note 3]    Patient Measurements   Per atp notes    Fall Risk Screen:   Has the patient fallen 2 or more times in the last year? Yes      Has the patient fallen and had an injury in the past year? Yes    Is the patient a fall risk? Yes, department fall risk interventions implemented    ADL:   Ind with need to pace self and sit down.  Shower chair in shower.    IADL:   Meals: from shelter  Home management:  light duty at shelter  Driving:  no longer able to drive  Occupation: working on disability  Evaluation     Pain assessment:  Pain present  Location: back pain/Ratin/10    Fatigue:  Reported Problems: Very limited functional endurance for a full days activities, Struggles with motor fatigue and ability to   Balance:  Unsupported Sitting Balance: Uses UE for Balance  Sitting Balance in Chair: Uses UE for Balance  Standing Balance: Uses UE for Balance    Ambulation:  Level of [place]: Independent ataic gait, hyperextending knees, right foot drop  Assistive Device(s): Wheelchair (manual), Patient walks  behind w/c for support so she can quickly sit as needed when legs are too fatigued and legs give out.      Transfers:   ind    Neuromuscular:  Reflexes: 2+ UE, 3+ LE, babinski present on the right  Sensory: vibration is absent in the left toe and ankle, mildly reduced in the left knee, severely reduced right toe and mild at the ankle, JPS moderate to severely reduced in the toes   Romberg is present  Coordination: RLE ataxia  Gait: ataxic gait, tandem severely impaired    Head and Neck:  Head and Neck Position: WFL    Upper Extremities  UE ROM: WFL  UE Strength: 5/5  Dominance: Right    Pelvis  Anterior/Posterior Pelvis Position: Posterior Tilt    Trunk:  Anterior/Posterior Trunk Position: Increased Lumbar Lordosis, Increased Thoracic Kyphosis    Lower Extremities:  LE ROM: WFL ataxic  LE Strength:   Hip flexion       4          4+  Knee flexion    5-         5  Knee ext          5          5  Ankle d/f          5-         5     Impairments:  Fatigue  Muscle atrophy  Coordination  Pain  Range of motion  Skin integrity     Treatment diagnosis:  Impaired mobility  Impaired activities of daily living     Recommendations/Plan of care:  Patient would benefit from interventions to enhance safety and independence.  Occupational therapy intervention for  wheelchair management.    Goals:   Target date:   Patient, family and/or caregiver will verbalize/demonstrate understanding of compensatory methods /equipment to enhance functional independence and safety.    Educational assessment/barriers to learning:  No barriers noted    Treatment provided this date:   Wheelchair management, 55 minutes   Determined need for ultra lightweight manual wheelchair due to LE weakness and significant ataxia that limits safety, independence and full time mobility.  She currently utilizes a manual wheelchair that is not her own.  She needs her own for proper fit and propulsion techniques with progressive illness.      Response to  treatment/recommendations: receptive    Goal attainment:  All goals met    Risks and benefits of evaluation/treatment have been explained.  Patient, family and/or caregiver are in agreement with Plan of Care.     Timed Code Treatment Minutes: 55  Total Treatment Time (sum of timed and untimed services): 55    Electronically signed by:  Naa DALTON, ATP      Occupational Therapist, Assistive   [phone]      fax: [phone]      [email or web address]  Seating Clinic- [facility], [address]  [address]  [place]   [address]  November 2, 2023    Coverage Criteria Per Local Coverage Determination    A) Ramonita has  mobility limitations due to MS that significantly impairs her ability to participate in all of her mobility-related activities of daily living (MRADL). Specifically affected are toileting (being able to get there in time to prevent accidents), dressing, and bathing (getting into the bathroom of designated place). Current equipment used is borrowed manual wheelchair. This patient needs the new equipment requested to be able to continue to be independent with all MRADLs and IADLS . Please see additional documentation in the seating and wheeled mobility report for details.   She had a successful clinical trial here, and also a successful trial at home with the recommended equipment. She is very willing and physically / cognitively able to use the recommended equipment to assist her with mobility related activities of daily living and general mobility.     B) Ramonita's mobility limitation cannot be sufficiently and safely resolved by the use of an appropriately fitted cane or walker because she is limited due to ataxic gait and instability resulting in falls.10.6 seconds for 25 feet with hyperextension due to ataxia and need to hold wall. Strength of legs is 4/5 at hips for one maximal repetition. Fatigue also  impacts this  patient's ability to ambulate, regardless of the gait aid.    C) Hung does not have sufficient upper extremity function to self-propel a k1-4 manual wheelchair and requires an optimally-configured K5 ultra lightweight manual wheelchair in her home to perform MRADLs during a typical day due to limitations in strength, endurance, range of motion, and coordination. Strength of arms is 5/5.    D)The need for this equipment is LIFETIME.     RECOMMENDATIONS FOR MOBILITY BASE, SEATING SYSTEM AND COMPONENTS  El ultra lightweight manual wheelchair - this ultra light weight manual wheelchair is appropriate and necessary for her to be able to assist and complete all of her MRADLs within her residence. She has mobility limitations impacting her ability to ambulate independently or with any ambulation aid. She has had a thorough clinical evaluation, and this manual wheelchair is the best option for this patient.  Any less costly wheelchair option would be unable to accommodate anterior-posterior axle adjustments to maximize propulsion mechanics required for shoulder preservation in full-time, active manual wheelchair propeller.      Composite casters - for smooth ride not to jar/shake her    Heel loops- needed for rearward leg support with chair use    Angle adjustable footrests - for leg support with limited coordination in Le and foot drop    Knobby tires - for maintenance free mobility and optimal  for all surfaces    Airgrip handrims- to allow for ergonomic  and increase propulsion techniques    Extension handle for wheel locks- allows for independence use of brakes for safety with chair use.    2 post, flip back, height adjustable, removable, full length armrests - needed for arm support at appropriate height, not available with standard armrests    Rear anti-tip tubes - for safety to prevent w/c tipping rearward    Pelvic positioning strap - to assist maintaining pelvis position for functional  activities    Bio fit  seat cushion - this pressure distribution and positioning seat cushion will optimally  distribute seating pressures to prevent pressure ulcers, but also provide a stable base of support for her to use during MRADLs.    Soild seat insert- needed to safely support cushion and prevent curving of cushion and internal hip movement.    Elite e2 Solid curved and padded back support - firm and contoured back support is needed to support Raymond's thoracolumbar area in an upright and midline position, with appropriate support pads as needed. This back support is essential to provide sufficient posterior support to maximize her postural alignment and minimize her tendency to develop scoliosis and other secondary complications.    This equipment is reasonable and necessary with reference to accepted standards of medical practice and treatment of this patient's condition and is not being recommended as a convenience item. Without this recommended equipment, she is highly likely to sustain injuries from falls, develop pressure sores or postural compensation, and/or be bed confined, which those costs far exceed the cost of the requested equipment.    Electronically signed by:  Naa DALTON, ATP      Occupational Therapist, Assistive   [phone]      fax: [phone]      [email or web address]  Seating Clinic- [facility], [place] [address]  November 2, 2023     I have read and concur with the above recommendations.    Physician Printed Name __________________________________________    Physician SIgnature  _____________________________________________    Date of SIgnature ______________________________    Physician Phone  ______________________________

## 2023-12-20 ENCOUNTER — ANCILLARY PROCEDURE (OUTPATIENT)
Dept: MRI IMAGING | Facility: CLINIC | Age: 33
End: 2023-12-20
Attending: PSYCHIATRY & NEUROLOGY
Payer: COMMERCIAL

## 2023-12-20 ENCOUNTER — OFFICE VISIT (OUTPATIENT)
Dept: NEUROLOGY | Facility: CLINIC | Age: 33
End: 2023-12-20
Attending: PSYCHIATRY & NEUROLOGY
Payer: COMMERCIAL

## 2023-12-20 ENCOUNTER — LAB (OUTPATIENT)
Dept: LAB | Facility: CLINIC | Age: 33
End: 2023-12-20
Payer: COMMERCIAL

## 2023-12-20 VITALS
DIASTOLIC BLOOD PRESSURE: 71 MMHG | WEIGHT: 146.2 LBS | SYSTOLIC BLOOD PRESSURE: 106 MMHG | OXYGEN SATURATION: 99 % | HEART RATE: 86 BPM | BODY MASS INDEX: 28.55 KG/M2

## 2023-12-20 DIAGNOSIS — G35 MS (MULTIPLE SCLEROSIS) (H): ICD-10-CM

## 2023-12-20 DIAGNOSIS — G81.91 RIGHT HEMIPARESIS (H): ICD-10-CM

## 2023-12-20 DIAGNOSIS — G35 MULTIPLE SCLEROSIS (H): ICD-10-CM

## 2023-12-20 DIAGNOSIS — G35 MULTIPLE SCLEROSIS (H): Primary | ICD-10-CM

## 2023-12-20 DIAGNOSIS — E55.9 VITAMIN D DEFICIENCY: ICD-10-CM

## 2023-12-20 LAB
CREAT SERPL-MCNC: 0.68 MG/DL (ref 0.51–0.95)
EGFRCR SERPLBLD CKD-EPI 2021: >90 ML/MIN/1.73M2
VIT D+METAB SERPL-MCNC: 33 NG/ML (ref 20–50)

## 2023-12-20 PROCEDURE — 70553 MRI BRAIN STEM W/O & W/DYE: CPT | Mod: GC | Performed by: RADIOLOGY

## 2023-12-20 PROCEDURE — G0463 HOSPITAL OUTPT CLINIC VISIT: HCPCS | Performed by: PSYCHIATRY & NEUROLOGY

## 2023-12-20 PROCEDURE — 99000 SPECIMEN HANDLING OFFICE-LAB: CPT | Performed by: PATHOLOGY

## 2023-12-20 PROCEDURE — 82565 ASSAY OF CREATININE: CPT | Performed by: PATHOLOGY

## 2023-12-20 PROCEDURE — A9585 GADOBUTROL INJECTION: HCPCS | Mod: JZ | Performed by: RADIOLOGY

## 2023-12-20 PROCEDURE — 99214 OFFICE O/P EST MOD 30 MIN: CPT | Performed by: PSYCHIATRY & NEUROLOGY

## 2023-12-20 PROCEDURE — 72157 MRI CHEST SPINE W/O & W/DYE: CPT | Mod: GC | Performed by: RADIOLOGY

## 2023-12-20 PROCEDURE — 72156 MRI NECK SPINE W/O & W/DYE: CPT | Mod: GC | Performed by: RADIOLOGY

## 2023-12-20 PROCEDURE — 82306 VITAMIN D 25 HYDROXY: CPT | Performed by: PSYCHIATRY & NEUROLOGY

## 2023-12-20 PROCEDURE — 36415 COLL VENOUS BLD VENIPUNCTURE: CPT | Performed by: PATHOLOGY

## 2023-12-20 RX ORDER — GADOBUTROL 604.72 MG/ML
7.5 INJECTION INTRAVENOUS ONCE
Status: COMPLETED | OUTPATIENT
Start: 2023-12-20 | End: 2023-12-20

## 2023-12-20 RX ORDER — DALFAMPRIDINE 10 MG/1
10 TABLET, FILM COATED, EXTENDED RELEASE ORAL 2 TIMES DAILY
Qty: 60 TABLET | Refills: 11 | Status: SHIPPED | OUTPATIENT
Start: 2023-12-20

## 2023-12-20 RX ADMIN — GADOBUTROL 5.5 ML: 604.72 INJECTION INTRAVENOUS at 11:45

## 2023-12-20 ASSESSMENT — PAIN SCALES - GENERAL: PAINLEVEL: NO PAIN (0)

## 2023-12-20 NOTE — PATIENT INSTRUCTIONS
Your MRI is stable   No new lesions and no active lesions     Your exam is stable as well     Try ampyra for walking   This comes from a specialty pharmacy - they will call you to deliver it     Blood work to check your kidney function     Follow up in 6 months

## 2023-12-20 NOTE — LETTER
12/20/2023       RE: Ramonita Trujillo  [address] [address]  [place] [address]       Dear Colleague,    Thank you for referring your patient, Ramonita Trujillo, to the [facility] MULTIPLE SCLEROSIS CLINIC [place] at [facility]. Please see a copy of my visit note below.    Date of Service:12/20/2023    [facility] Neurology   MS Clinic Evaluation    Subjective: 33-year-old woman who presents for evaluation of multiple sclerosis.    She does not report any new symptoms today.    Her gait is impaired.  She notices that her gait is worse in the summer months.  She seems more steady when it is cooler outside.  She continues to have episodic falls.  She believes that her last fall was 1 to 2 months ago.  Most commonly she will tripped over her right foot, though she does have a tendency to wall walk.  She does have a wheelchair that she is using intermittently.  She also underwent a seating evaluation and will be obtaining her own wheelchair.    She has been working at [employer].  It seems that they have done a good job accommodating her physical challenges.    She does use medical marijuana and reports that this is helpful for mood.      Disease onset: age 27, urinary urgency, gait instability    DMD hx:   Copaxone never tried    No Known Allergies    Current Outpatient Medications   Medication    diazepam (VALIUM) 5 MG tablet    diazepam (VALIUM) 5 MG tablet    doxycycline hyclate (VIBRAMYCIN) 100 MG capsule    vitamin D3 (CHOLECALCIFEROL) 1.25 MG (07142 UT) capsule    vitamin D3 (CHOLECALCIFEROL) 50 mcg (2000 units) tablet     No current facility-administered medications for this visit.        Past medical, surgical, social and family history was personally reviewed. Pertinent details noted above.     Physical Examination:   /71 (BP Location: Left arm, Patient Position: Sitting, Cuff Size: Adult Regular)   Pulse 86   Wt 66.3 kg (146 lb 3.2 oz)   SpO2 99%    BMI 28.55 kg/m      General: no acute distress  Cranial nerves:   VFFC  PERRL w/no RAPD  EOM full w/no MORENA   Face symmetric  Hearing intact  No dysarthria   Motor:   Tone is normal   Bulk is normal                           R          L  Deltoid             5          5  Biceps             5          5  Triceps            5          5  Wrist ext          5          5  Finger ext        5          5  Finger abd       5          5     Hip flexion       4          4+  Knee flexion    5-         5  Knee ext          5          5  Ankle d/f          5-         5     Reflexes: 2+ UE, 3+ LE, babinski present on the right  Sensory: vibration is absent in the left toe and ankle, mildly reduced in the left knee, severely reduced right toe and mild at the ankle, JPS moderate to severely reduced in the toes   Romberg is present  Coordination: RLE ataxia  Gait: ataxic gait, tandem severely impaired  25 foot walk: 7.2 sec    Tests/Imaging:     Vitamin D 19  JCV Ab unk      MRI Brain  9/2020 - high lesion burden with mix of lesions in the periventricular region, deep white matter, charu, T1 holes, gd-  1/2021 - no definite new lesions, gd-   12/2022 - no new lesions, gd-   12/2023 - no new lesions, gd-     MRI Cervical spine   9/2020 - multiple small eccentric cord lesions, quality of image not the best, gd-  1/2021 - no definite new lesions, gd-   12/2022 - no new lesions, gd-   12/2023 - no new lesions,gd-    MRI Thoracic spine   9/2020 - again quality not the best, it does appears that there are multiple small lesions, gd-   12/2022 -  No definite new lesions, gd-, lesions noted at T5-6, T6-7, and T 10-11  12/2023 - no new lesions, gd-    Assessment: 33-year-old woman with relapsing remitting multiple sclerosis who has been off of disease modifying therapy.  MRI and clinical examination today revealed that she is clinically stable.    She does have significant gait impairment that limits her ambulation.  I have recommended that  she try dalfampridine.  Rationale of this medication was discussed in detail.  She has no prior history of seizures.  We will obtain a creatinine today to ensure adequate renal function.    I think it is appropriate for her to obtain a manual wheelchair.  This will permit her to still get some physical activity, but will improve her mobility.    Plan:   -Blood work today  - Follow-up in 6 months  - Dalfampridine trial    Note was completed with the assistance of Dragon Fluency software which can often result in accidental word substitutions.     A total of 30 minutes on the date of service were spent in the care of this patient.   Reena Ortega MD on 12/20/2023 at 2:49 PM      Again, thank you for allowing me to participate in the care of your patient.      Sincerely,    Reena Ortega MD

## 2023-12-20 NOTE — NURSING NOTE
Chief Complaint   Patient presents with    MS    RECHECK     MS follow up      Vitals were taken and medications were reconciled.   Kei Herrera, EMT  2:39 PM

## 2023-12-20 NOTE — PROGRESS NOTES
Date of Service:12/20/2023    [facility] Neurology   MS Clinic Evaluation    Subjective: 33-year-old woman who presents for evaluation of multiple sclerosis.    She does not report any new symptoms today.    Her gait is impaired.  She notices that her gait is worse in the summer months.  She seems more steady when it is cooler outside.  She continues to have episodic falls.  She believes that her last fall was 1 to 2 months ago.  Most commonly she will tripped over her right foot, though she does have a tendency to wall walk.  She does have a wheelchair that she is using intermittently.  She also underwent a seating evaluation and will be obtaining her own wheelchair.    She has been working at [employer].  It seems that they have done a good job accommodating her physical challenges.    She does use medical marijuana and reports that this is helpful for mood.      Disease onset: age 27, urinary urgency, gait instability    DMD hx:   Copaxone never tried    No Known Allergies    Current Outpatient Medications   Medication    diazepam (VALIUM) 5 MG tablet    diazepam (VALIUM) 5 MG tablet    doxycycline hyclate (VIBRAMYCIN) 100 MG capsule    vitamin D3 (CHOLECALCIFEROL) 1.25 MG (15659 UT) capsule    vitamin D3 (CHOLECALCIFEROL) 50 mcg (2000 units) tablet     No current facility-administered medications for this visit.        Past medical, surgical, social and family history was personally reviewed. Pertinent details noted above.     Physical Examination:   /71 (BP Location: Left arm, Patient Position: Sitting, Cuff Size: Adult Regular)   Pulse 86   Wt 66.3 kg (146 lb 3.2 oz)   SpO2 99%   BMI 28.55 kg/m      General: no acute distress  Cranial nerves:   VFFC  PERRL w/no RAPD  EOM full w/no MORENA   Face symmetric  Hearing intact  No dysarthria   Motor:   Tone is normal   Bulk is normal                           R          L  Deltoid             5          5  Biceps             5          5  Triceps            5           5  Wrist ext          5          5  Finger ext        5          5  Finger abd       5          5     Hip flexion       4          4+  Knee flexion    5-         5  Knee ext          5          5  Ankle d/f          5-         5     Reflexes: 2+ UE, 3+ LE, babinski present on the right  Sensory: vibration is absent in the left toe and ankle, mildly reduced in the left knee, severely reduced right toe and mild at the ankle, JPS moderate to severely reduced in the toes   Romberg is present  Coordination: RLE ataxia  Gait: ataxic gait, tandem severely impaired  25 foot walk: 7.2 sec    Tests/Imaging:     Vitamin D 19  JCV Ab unk      MRI Brain  9/2020 - high lesion burden with mix of lesions in the periventricular region, deep white matter, charu, T1 holes, gd-  1/2021 - no definite new lesions, gd-   12/2022 - no new lesions, gd-   12/2023 - no new lesions, gd-     MRI Cervical spine   9/2020 - multiple small eccentric cord lesions, quality of image not the best, gd-  1/2021 - no definite new lesions, gd-   12/2022 - no new lesions, gd-   12/2023 - no new lesions,gd-    MRI Thoracic spine   9/2020 - again quality not the best, it does appears that there are multiple small lesions, gd-   12/2022 -  No definite new lesions, gd-, lesions noted at T5-6, T6-7, and T 10-11  12/2023 - no new lesions, gd-    Assessment: 33-year-old woman with relapsing remitting multiple sclerosis who has been off of disease modifying therapy.  MRI and clinical examination today revealed that she is clinically stable.    She does have significant gait impairment that limits her ambulation.  I have recommended that she try dalfampridine.  Rationale of this medication was discussed in detail.  She has no prior history of seizures.  We will obtain a creatinine today to ensure adequate renal function.    I think it is appropriate for her to obtain a manual wheelchair.  This will permit her to still get some physical activity, but will improve  her mobility.    Plan:   -Blood work today  - Follow-up in 6 months  - Dalfampridine trial    Note was completed with the assistance of Dragon Fluency software which can often result in accidental word substitutions.     A total of 30 minutes on the date of service were spent in the care of this patient.   Reena Ortega MD on 12/20/2023 at 2:49 PM

## 2023-12-20 NOTE — DISCHARGE INSTRUCTIONS
MRI Contrast Discharge Instructions    The IV contrast you received today will pass out of your body in your  urine. This will happen in the next 24 hours. You will not feel this process.  Your urine will not change color.    Drink at least 4 extra glasses of water or juice today (unless your doctor  has restricted your fluids). This reduces the stress on your kidneys.  You may take your regular medicines.    If you are on dialysis: It is best to have dialysis today.    If you have a reaction: Most reactions happen right away. If you have  any new symptoms after leaving the hospital (such as hives or swelling),  call your hospital at the correct number below. Or call your family doctor.  If you have breathing distress or wheezing, call 911.    Special instructions: ***    I have read and understand the above information.    Signature:______________________________________ Date:___________    Staff:__________________________________________ Date:___________     Time:__________    [facility] Departments:    ___[place]: [phone]  ___[facility]: [phone]  ___[place]: [phone] ___[facility]: [phone]  ___[facility]: [phone]  ___[facility]: [phone]  ___[phone]  ___[facility]: [phone]  ___[phone]

## 2023-12-21 ENCOUNTER — TELEPHONE (OUTPATIENT)
Dept: NEUROLOGY | Facility: CLINIC | Age: 33
End: 2023-12-21
Payer: COMMERCIAL

## 2023-12-21 NOTE — TELEPHONE ENCOUNTER
PA Initiation    Medication: DALFAMPRIDINE ER 10 MG PO TB12  Insurance Company: [employer] - Phone [phone] Fax [phone]  Pharmacy Filling the Rx: [facility] - [place], [place] - [address]  Filling Pharmacy Phone:    Filling Pharmacy Fax:    Start Date: 12/21/2023          Thank you,    Radha Reis [facility]  Specialty Pharmacy Clinic Liaison - [facility] [address]  [address] [place] [address]  Ph: [phone] Fax: [phone]  [email or web address]

## 2023-12-22 NOTE — TELEPHONE ENCOUNTER
Prior Authorization Approval    Medication: DALFAMPRIDINE ER 10 MG PO TB12  Authorization Effective Date: 11/21/2023  Authorization Expiration Date: 12/20/2024  Approved Dose/Quantity: 30 days, #60  Reference #:     Insurance Company: [employer] - Phone [phone] Fax [phone]  Expected CoPay: $ 1  CoPay Card Available: No    Financial Assistance Needed: N/A  Which Pharmacy is filling the prescription: [facility] - [place], [place] - [address]  Pharmacy Notified: Yes  Patient Notified: Yes          Thank you,    Radha Reis CPh-T  Specialty Pharmacy Clinic Liaison - CardiologyNeurologyMultiple Sclerosis  [facility]  [address] [place] [address]  3[address] [place] [address]  Ph: [phone] Fax: [phone]  [email or web address]

## 2024-02-11 ENCOUNTER — HEALTH MAINTENANCE LETTER (OUTPATIENT)
Age: 34
End: 2024-02-11

## 2024-02-12 ENCOUNTER — DOCUMENTATION ONLY (OUTPATIENT)
Dept: NEUROLOGY | Facility: CLINIC | Age: 34
End: 2024-02-12
Payer: COMMERCIAL

## 2024-02-12 NOTE — PROGRESS NOTES
Practitioner standard written orders have been received from [facility], orders placed in Dr. Ortega's folder for review and signature.   Kei Herrera EMT 02/12/2024 8:32PM

## 2024-02-20 NOTE — PROGRESS NOTES
Standard written orders have been signed and faxed back at [phone].  Kei Herrera EMT 02/20/2024 10:28AM

## 2024-06-07 ENCOUNTER — OFFICE VISIT (OUTPATIENT)
Dept: NEUROLOGY | Facility: CLINIC | Age: 34
End: 2024-06-07
Attending: PSYCHIATRY & NEUROLOGY
Payer: COMMERCIAL

## 2024-06-07 VITALS
SYSTOLIC BLOOD PRESSURE: 106 MMHG | BODY MASS INDEX: 27.05 KG/M2 | HEART RATE: 79 BPM | DIASTOLIC BLOOD PRESSURE: 68 MMHG | WEIGHT: 138.5 LBS | OXYGEN SATURATION: 100 %

## 2024-06-07 DIAGNOSIS — G35 MULTIPLE SCLEROSIS (H): Primary | ICD-10-CM

## 2024-06-07 DIAGNOSIS — R26.0 ATAXIC GAIT: ICD-10-CM

## 2024-06-07 DIAGNOSIS — G81.91 RIGHT HEMIPARESIS (H): ICD-10-CM

## 2024-06-07 PROCEDURE — 99214 OFFICE O/P EST MOD 30 MIN: CPT | Performed by: PSYCHIATRY & NEUROLOGY

## 2024-06-07 PROCEDURE — G0463 HOSPITAL OUTPT CLINIC VISIT: HCPCS | Performed by: PSYCHIATRY & NEUROLOGY

## 2024-06-07 ASSESSMENT — PAIN SCALES - GENERAL: PAINLEVEL: NO PAIN (0)

## 2024-06-07 NOTE — NURSING NOTE
Chief Complaint   Patient presents with    MS    RECHECK     6 month follow up      Vitals were taken and medications were reconciled.   Kei Herrera, EMT  9:36 AM

## 2024-06-07 NOTE — LETTER
6/7/2024       RE: Ramonita Trujillo  [address] [address]  [facility] [address]       Dear Colleague,    Thank you for referring your patient, Ramonita Trujillo, to the [facility] MULTIPLE SCLEROSIS CLINIC [place] at [facility]. Please see a copy of my visit note below.    Date of Service: 6/7/2024    [facility] Neurology   MS Clinic Evaluation    Subjective: 34-year-old woman who presents for evaluation of multiple sclerosis.    No discrete new symptoms related to multiple sclerosis.  She is able to walk around her apartment building, but if she leaves her apartment building or has to walk for a longer distance she relies on her wheelchair.  She has had a few falls, 1 of these was more significant.  Fortunately she did not suffer any major injuries.    She did try dalfampridine, but found that her gait was actually more unsteady when she took this medication.  She therefore discontinued the medication.    She is now living in a home.  She remains motivated to improve her diet.    Disease onset: age 27, urinary urgency, gait instability    DMD hx:   Copaxone never tried    No Known Allergies    Current Outpatient Medications   Medication Sig Dispense Refill    dalfampridine (AMPYRA) 10 MG TB12 12 hr tablet Take 1 tablet (10 mg) by mouth 2 times daily 60 tablet 11    diazepam (VALIUM) 5 MG tablet Take 1-2 tablets 30 minutes before MRI, 3rd tablet if needed. No driving for 8 hours after taking. 3 tablet 0    diazepam (VALIUM) 5 MG tablet Take 1-2 tablets 30 minutes before MRI, 3rd tablet if needed. No driving for 8 hours after taking. (Patient not taking: Reported on 1/24/2023) 3 tablet 0    doxycycline hyclate (VIBRAMYCIN) 100 MG capsule Take 1 capsule (100 mg) by mouth 2 times daily (Patient not taking: Reported on 1/24/2023) 14 capsule 0    vitamin D3 (CHOLECALCIFEROL) 1.25 MG (52456 UT) capsule Take 1 capsule (50,000 Units) by mouth once a week (Patient not  taking: Reported on 6/28/2023) 12 capsule 3    vitamin D3 (CHOLECALCIFEROL) 50 mcg (2000 units) tablet Take 1 tablet (50 mcg) by mouth daily (Patient not taking: Reported on 6/28/2023) 91 tablet 3     No current facility-administered medications for this visit.        Past medical, surgical, social and family history was personally reviewed. Pertinent details noted above.     Physical Examination:   /68 (BP Location: Left arm, Patient Position: Sitting, Cuff Size: Adult Regular)   Pulse 79   Wt 62.8 kg (138 lb 8 oz)   SpO2 100%   BMI 27.05 kg/m      General: no acute distress  Cranial nerves:   VFFC  PERRL w/no RAPD  EOM full w/no MORENA   Face symmetric  Hearing intact  No dysarthria   Motor:   Tone is normal   Bulk is normal                           R          L  Deltoid             5          5  Biceps             5          5  Triceps            5          5  Wrist ext          5          5  Finger ext        5          5  Finger abd       5          5     Hip flexion       4          4+  Knee flexion    5-         5  Knee ext          5          5  Ankle d/f          5-         5     Reflexes: 2+ UE, 3+ LE, babinski present on the right  Sensory: vibration is absent in the left toe and ankle, mildly reduced in the left knee, severely reduced right toe and mild at the ankle, JPS moderate to severely reduced in the toes   Romberg is present  Coordination: RLE ataxia  Gait: ataxic gait, tandem severely impaired  25 foot walk: 7.2 sec    Tests/Imaging:     Vitamin D 19->33  JCV Ab unk      MRI Brain  9/2020 - high lesion burden with mix of lesions in the periventricular region, deep white matter, charu, T1 holes, gd-  1/2021 - no definite new lesions, gd-   12/2022 - no new lesions, gd-   12/2023 - no new lesions, gd-     MRI Cervical spine   9/2020 - multiple small eccentric cord lesions, quality of image not the best, gd-  1/2021 - no definite new lesions, gd-   12/2022 - no new lesions, gd-   12/2023 - no  new lesions,gd-    MRI Thoracic spine   9/2020 - again quality not the best, it does appears that there are multiple small lesions, gd-   12/2022 -  No definite new lesions, gd-, lesions noted at T5-6, T6-7, and T 10-11  12/2023 - no new lesions, gd-    Assessment: 34-year-old woman with relapsing remitting multiple sclerosis who has been off of disease modifying therapy.  She appears to be clinically stable.  MRI surveillance has remained stable as well.  I think it is reasonable for her to plan for MRI in 1 year.  This was discussed with the patient.    Today we helped her with enrollment into the open arms meal program.    She is encouraged to reach out if she develops any new symptoms related to multiple sclerosis, as this may prompt earlier imaging.    Plan:   - Follow-up in 6 months    Note was completed with the assistance of Dragon Fluency software which can often result in accidental word substitutions.     A total of 30 minutes on the date of service were spent in the care of this patient.   Reena Ortega MD on 6/7/2024 at 10:39 AM        Again, thank you for allowing me to participate in the care of your patient.      Sincerely,    Reena Ortega MD

## 2024-06-07 NOTE — PATIENT INSTRUCTIONS
Your exam appears stable today    I strongly encourage you to try the open arms program, I have truly received positive feedback from patients    Please follow-up with me in 6 months, plan for MRI in 1 year  Earlier MRI would be arranged if you develop new symptoms  Do not hesitate to reach out

## 2024-06-07 NOTE — PROGRESS NOTES
Date of Service: 6/7/2024    [facility] Neurology   MS Clinic Evaluation    Subjective: 34-year-old woman who presents for evaluation of multiple sclerosis.    No discrete new symptoms related to multiple sclerosis.  She is able to walk around her apartment building, but if she leaves her apartment building or has to walk for a longer distance she relies on her wheelchair.  She has had a few falls, 1 of these was more significant.  Fortunately she did not suffer any major injuries.    She did try dalfampridine, but found that her gait was actually more unsteady when she took this medication.  She therefore discontinued the medication.    She is now living in a home.  She remains motivated to improve her diet.    Disease onset: age 27, urinary urgency, gait instability    DMD hx:   Copaxone never tried    No Known Allergies    Current Outpatient Medications   Medication Sig Dispense Refill    dalfampridine (AMPYRA) 10 MG TB12 12 hr tablet Take 1 tablet (10 mg) by mouth 2 times daily 60 tablet 11    diazepam (VALIUM) 5 MG tablet Take 1-2 tablets 30 minutes before MRI, 3rd tablet if needed. No driving for 8 hours after taking. 3 tablet 0    diazepam (VALIUM) 5 MG tablet Take 1-2 tablets 30 minutes before MRI, 3rd tablet if needed. No driving for 8 hours after taking. (Patient not taking: Reported on 1/24/2023) 3 tablet 0    doxycycline hyclate (VIBRAMYCIN) 100 MG capsule Take 1 capsule (100 mg) by mouth 2 times daily (Patient not taking: Reported on 1/24/2023) 14 capsule 0    vitamin D3 (CHOLECALCIFEROL) 1.25 MG (31448 UT) capsule Take 1 capsule (50,000 Units) by mouth once a week (Patient not taking: Reported on 6/28/2023) 12 capsule 3    vitamin D3 (CHOLECALCIFEROL) 50 mcg (2000 units) tablet Take 1 tablet (50 mcg) by mouth daily (Patient not taking: Reported on 6/28/2023) 91 tablet 3     No current facility-administered medications for this visit.        Past medical, surgical, social and family history was personally  reviewed. Pertinent details noted above.     Physical Examination:   /68 (BP Location: Left arm, Patient Position: Sitting, Cuff Size: Adult Regular)   Pulse 79   Wt 62.8 kg (138 lb 8 oz)   SpO2 100%   BMI 27.05 kg/m      General: no acute distress  Cranial nerves:   VFFC  PERRL w/no RAPD  EOM full w/no MORENA   Face symmetric  Hearing intact  No dysarthria   Motor:   Tone is normal   Bulk is normal                           R          L  Deltoid             5          5  Biceps             5          5  Triceps            5          5  Wrist ext          5          5  Finger ext        5          5  Finger abd       5          5     Hip flexion       4          4+  Knee flexion    5-         5  Knee ext          5          5  Ankle d/f          5-         5     Reflexes: 2+ UE, 3+ LE, babinski present on the right  Sensory: vibration is absent in the left toe and ankle, mildly reduced in the left knee, severely reduced right toe and mild at the ankle, JPS moderate to severely reduced in the toes   Romberg is present  Coordination: RLE ataxia  Gait: ataxic gait, tandem severely impaired  25 foot walk: 7.2 sec    Tests/Imaging:     Vitamin D 19->33  JCV Ab unk      MRI Brain  9/2020 - high lesion burden with mix of lesions in the periventricular region, deep white matter, charu, T1 holes, gd-  1/2021 - no definite new lesions, gd-   12/2022 - no new lesions, gd-   12/2023 - no new lesions, gd-     MRI Cervical spine   9/2020 - multiple small eccentric cord lesions, quality of image not the best, gd-  1/2021 - no definite new lesions, gd-   12/2022 - no new lesions, gd-   12/2023 - no new lesions,gd-    MRI Thoracic spine   9/2020 - again quality not the best, it does appears that there are multiple small lesions, gd-   12/2022 -  No definite new lesions, gd-, lesions noted at T5-6, T6-7, and T 10-11  12/2023 - no new lesions, gd-    Assessment: 34-year-old woman with relapsing remitting multiple sclerosis who has  been off of disease modifying therapy.  She appears to be clinically stable.  MRI surveillance has remained stable as well.  I think it is reasonable for her to plan for MRI in 1 year.  This was discussed with the patient.    Today we helped her with enrollment into the open arms meal program.    She is encouraged to reach out if she develops any new symptoms related to multiple sclerosis, as this may prompt earlier imaging.    Plan:   - Follow-up in 6 months    Note was completed with the assistance of Dragon Fluency software which can often result in accidental word substitutions.     A total of 30 minutes on the date of service were spent in the care of this patient.   Reena Ortega MD on 6/7/2024 at 10:39 AM               Walk in / drive in

## 2024-06-13 ENCOUNTER — DOCUMENTATION ONLY (OUTPATIENT)
Dept: NEUROLOGY | Facility: CLINIC | Age: 34
End: 2024-06-13
Payer: COMMERCIAL

## 2024-12-11 ENCOUNTER — OFFICE VISIT (OUTPATIENT)
Dept: NEUROLOGY | Facility: CLINIC | Age: 34
End: 2024-12-11
Attending: PSYCHIATRY & NEUROLOGY
Payer: COMMERCIAL

## 2024-12-11 VITALS
HEART RATE: 105 BPM | OXYGEN SATURATION: 98 % | DIASTOLIC BLOOD PRESSURE: 63 MMHG | WEIGHT: 124.4 LBS | SYSTOLIC BLOOD PRESSURE: 103 MMHG | BODY MASS INDEX: 22.89 KG/M2 | HEIGHT: 62 IN

## 2024-12-11 DIAGNOSIS — G35 MULTIPLE SCLEROSIS (H): Primary | ICD-10-CM

## 2024-12-11 DIAGNOSIS — G81.91 RIGHT HEMIPARESIS (H): ICD-10-CM

## 2024-12-11 DIAGNOSIS — G35 MS (MULTIPLE SCLEROSIS) (H): ICD-10-CM

## 2024-12-11 DIAGNOSIS — R26.0 ATAXIC GAIT: ICD-10-CM

## 2024-12-11 PROCEDURE — G0463 HOSPITAL OUTPT CLINIC VISIT: HCPCS | Performed by: PSYCHIATRY & NEUROLOGY

## 2024-12-11 PROCEDURE — 99214 OFFICE O/P EST MOD 30 MIN: CPT | Performed by: PSYCHIATRY & NEUROLOGY

## 2024-12-11 RX ORDER — DIAZEPAM 5 MG/1
TABLET ORAL
Qty: 3 TABLET | Refills: 0 | Status: SHIPPED | OUTPATIENT
Start: 2025-05-12

## 2024-12-11 ASSESSMENT — PAIN SCALES - GENERAL: PAINLEVEL_OUTOF10: NO PAIN (0)

## 2024-12-11 NOTE — NURSING NOTE
Chief Complaint   Patient presents with    MS    RECHECK     6 month follow up      Vitals were taken and medications were reconciled.   Kei Herrera, EMT  9:32 AM

## 2024-12-11 NOTE — PATIENT INSTRUCTIONS
Work on incorporating an exercise routine and stretching routine to help you maintain strength and flexibility     Mri in 6 months     Follow up after MRI

## 2024-12-11 NOTE — LETTER
12/11/2024       RE: Ramonita Trujillo  [address]  [place] [address]     Dear Colleague,    Thank you for referring your patient, Ramonita Trujillo, to the [facility] MULTIPLE SCLEROSIS CLINIC [place] at [facility]. Please see a copy of my visit note below.    Date of Service: 12/11/2024    [facility] Neurology   MS Clinic Evaluation    Subjective: 34-year-old woman who presents for evaluation of multiple sclerosis.    No new symptoms reported today    She did try taking Ampyra, but felt that her walking got worse.    She states that she is potentially walking better.  She does not find her cell tripping or falling as often.  She is learning to listen to her body.  On bad days she does utilize a cane.  She is not using the wheelchair as often lately, though this is partly weather related.  She is not going outside given that it is colder.    Bladder control seems to be a little bit better in the past few months.  She has learned to go to the bathroom in anticipation of any events.  Bowel control is intact.    She is working at [employer].    Disease onset: age 27, urinary urgency, gait instability    DMD hx:   Copaxone never tried    No Known Allergies    Current Outpatient Medications   Medication Sig Dispense Refill     diazepam (VALIUM) 5 MG tablet Take 1-2 tablets 30 minutes before MRI, 3rd tablet if needed. No driving for 8 hours after taking. 3 tablet 0     dalfampridine (AMPYRA) 10 MG TB12 12 hr tablet Take 1 tablet (10 mg) by mouth 2 times daily (Patient not taking: Reported on 12/11/2024) 60 tablet 11     diazepam (VALIUM) 5 MG tablet Take 1-2 tablets 30 minutes before MRI, 3rd tablet if needed. No driving for 8 hours after taking. (Patient not taking: Reported on 12/11/2024) 3 tablet 0     doxycycline hyclate (VIBRAMYCIN) 100 MG capsule Take 1 capsule (100 mg) by mouth 2 times daily (Patient not taking: Reported on 12/11/2024) 14 capsule  "0     vitamin D3 (CHOLECALCIFEROL) 1.25 MG (31870 UT) capsule Take 1 capsule (50,000 Units) by mouth once a week (Patient not taking: Reported on 12/11/2024) 12 capsule 3     vitamin D3 (CHOLECALCIFEROL) 50 mcg (2000 units) tablet Take 1 tablet (50 mcg) by mouth daily (Patient not taking: Reported on 12/11/2024) 91 tablet 3     No current facility-administered medications for this visit.        Past medical, surgical, social and family history was personally reviewed. Pertinent details noted above.     Physical Examination:   /63 (BP Location: Left arm, Patient Position: Sitting, Cuff Size: Adult Regular)   Pulse 105   Ht 1.565 m (5' 1.61\")   Wt 56.4 kg (124 lb 6.4 oz)   SpO2 98%   BMI 23.04 kg/m      General: no acute distress  Cranial nerves:   VFFC  PERRL w/no RAPD  EOM full w/no MORENA   Face symmetric  Hearing intact  No dysarthria   Motor:   Tone is normal   Bulk is normal                           R          L  Deltoid             5          5  Biceps             5          5  Triceps            5          5  Wrist ext          5          5  Finger ext        5          5  Finger abd       5          5     Hip flexion       4          4+  Knee flexion    5-         5  Knee ext          5          5  Ankle d/f          4+         5     Reflexes: 2+ UE, 3+ LE but 4+ R achilles, babinski present on the right  Sensory: vibration is absent in the toes and ankles, mildly reduced in the knees, JPS  severely reduced in the toes   Romberg is present  Coordination: moderate ataxia of LLE, severe ataxia of RLE  Gait: ataxic gait with right circumduction, tandem severely impaired      Tests/Imaging:     Vitamin D 19->33  JCV Ab unk      MRI Brain  9/2020 - high lesion burden with mix of lesions in the periventricular region, deep white matter, charu, T1 holes, gd-  1/2021 - no definite new lesions, gd-   12/2022 - no new lesions, gd-   12/2023 - no new lesions, gd-     MRI Cervical spine   9/2020 - multiple small " eccentric cord lesions, quality of image not the best, gd-  1/2021 - no definite new lesions, gd-   12/2022 - no new lesions, gd-   12/2023 - no new lesions,gd-    MRI Thoracic spine   9/2020 - again quality not the best, it does appears that there are multiple small lesions, gd-   12/2022 -  No definite new lesions, gd-, lesions noted at T5-6, T6-7, and T 10-11  12/2023 - no new lesions, gd-    Assessment: 34-year-old woman with relapsing remitting multiple sclerosis who has been off of disease modifying therapy.  Clinical examination does reveal a slight decline in strength in the right side, decline in sensation, and increase in spasticity.  There are changes that can be expected with the chronic lesions that she has, though radiologic surveillance is advised in 6 months.    She is motivated to try an exercise routine.  She will to try to incorporate this independently.    Plan:   - MRI in 6 months  - Follow-up after MRI    Note was completed with the assistance of Dragon Fluency software which can often result in accidental word substitutions.     A total of 30 minutes on the date of service were spent in the care of this patient.   Reena Ortega MD on 12/11/2024 at 9:42 AM                  Again, thank you for allowing me to participate in the care of your patient.      Sincerely,    eRena Ortega MD

## 2024-12-11 NOTE — PROGRESS NOTES
Date of Service: 12/11/2024    [facility] Neurology   MS Clinic Evaluation    Subjective: 34-year-old woman who presents for evaluation of multiple sclerosis.    No new symptoms reported today    She did try taking Ampyra, but felt that her walking got worse.    She states that she is potentially walking better.  She does not find her cell tripping or falling as often.  She is learning to listen to her body.  On bad days she does utilize a cane.  She is not using the wheelchair as often lately, though this is partly weather related.  She is not going outside given that it is colder.    Bladder control seems to be a little bit better in the past few months.  She has learned to go to the bathroom in anticipation of any events.  Bowel control is intact.    She is working at [employer].    Disease onset: age 27, urinary urgency, gait instability    DMD hx:   Copaxone never tried    No Known Allergies    Current Outpatient Medications   Medication Sig Dispense Refill    diazepam (VALIUM) 5 MG tablet Take 1-2 tablets 30 minutes before MRI, 3rd tablet if needed. No driving for 8 hours after taking. 3 tablet 0    dalfampridine (AMPYRA) 10 MG TB12 12 hr tablet Take 1 tablet (10 mg) by mouth 2 times daily (Patient not taking: Reported on 12/11/2024) 60 tablet 11    diazepam (VALIUM) 5 MG tablet Take 1-2 tablets 30 minutes before MRI, 3rd tablet if needed. No driving for 8 hours after taking. (Patient not taking: Reported on 12/11/2024) 3 tablet 0    doxycycline hyclate (VIBRAMYCIN) 100 MG capsule Take 1 capsule (100 mg) by mouth 2 times daily (Patient not taking: Reported on 12/11/2024) 14 capsule 0    vitamin D3 (CHOLECALCIFEROL) 1.25 MG (00814 UT) capsule Take 1 capsule (50,000 Units) by mouth once a week (Patient not taking: Reported on 12/11/2024) 12 capsule 3    vitamin D3 (CHOLECALCIFEROL) 50 mcg (2000 units) tablet Take 1 tablet (50 mcg) by mouth daily (Patient not taking: Reported on 12/11/2024) 91 tablet 3     No  "current facility-administered medications for this visit.        Past medical, surgical, social and family history was personally reviewed. Pertinent details noted above.     Physical Examination:   /63 (BP Location: Left arm, Patient Position: Sitting, Cuff Size: Adult Regular)   Pulse 105   Ht 1.565 m (5' 1.61\")   Wt 56.4 kg (124 lb 6.4 oz)   SpO2 98%   BMI 23.04 kg/m      General: no acute distress  Cranial nerves:   VFFC  PERRL w/no RAPD  EOM full w/no MORENA   Face symmetric  Hearing intact  No dysarthria   Motor:   Tone is normal   Bulk is normal                           R          L  Deltoid             5          5  Biceps             5          5  Triceps            5          5  Wrist ext          5          5  Finger ext        5          5  Finger abd       5          5     Hip flexion       4          4+  Knee flexion    5-         5  Knee ext          5          5  Ankle d/f          4+         5     Reflexes: 2+ UE, 3+ LE but 4+ R achilles, babinski present on the right  Sensory: vibration is absent in the toes and ankles, mildly reduced in the knees, JPS  severely reduced in the toes   Romberg is present  Coordination: moderate ataxia of LLE, severe ataxia of RLE  Gait: ataxic gait with right circumduction, tandem severely impaired      Tests/Imaging:     Vitamin D 19->33  JCV Ab unk      MRI Brain  9/2020 - high lesion burden with mix of lesions in the periventricular region, deep white matter, charu, T1 holes, gd-  1/2021 - no definite new lesions, gd-   12/2022 - no new lesions, gd-   12/2023 - no new lesions, gd-     MRI Cervical spine   9/2020 - multiple small eccentric cord lesions, quality of image not the best, gd-  1/2021 - no definite new lesions, gd-   12/2022 - no new lesions, gd-   12/2023 - no new lesions,gd-    MRI Thoracic spine   9/2020 - again quality not the best, it does appears that there are multiple small lesions, gd-   12/2022 -  No definite new lesions, gd-, lesions " noted at T5-6, T6-7, and T 10-11  12/2023 - no new lesions, gd-    Assessment: 34-year-old woman with relapsing remitting multiple sclerosis who has been off of disease modifying therapy.  Clinical examination does reveal a slight decline in strength in the right side, decline in sensation, and increase in spasticity.  There are changes that can be expected with the chronic lesions that she has, though radiologic surveillance is advised in 6 months.    She is motivated to try an exercise routine.  She will to try to incorporate this independently.    Plan:   - MRI in 6 months  - Follow-up after MRI    Note was completed with the assistance of Dragon Fluency software which can often result in accidental word substitutions.     A total of 30 minutes on the date of service were spent in the care of this patient.   Reena Ortega MD on 12/11/2024 at 9:42 AM

## 2025-03-08 ENCOUNTER — HEALTH MAINTENANCE LETTER (OUTPATIENT)
Age: 35
End: 2025-03-08

## 2025-06-11 ENCOUNTER — ANCILLARY PROCEDURE (OUTPATIENT)
Dept: MRI IMAGING | Facility: CLINIC | Age: 35
End: 2025-06-11
Attending: PSYCHIATRY & NEUROLOGY
Payer: COMMERCIAL

## 2025-06-11 ENCOUNTER — RESULTS FOLLOW-UP (OUTPATIENT)
Dept: NEUROLOGY | Facility: CLINIC | Age: 35
End: 2025-06-11

## 2025-06-11 ENCOUNTER — OFFICE VISIT (OUTPATIENT)
Dept: NEUROLOGY | Facility: CLINIC | Age: 35
End: 2025-06-11
Attending: PSYCHIATRY & NEUROLOGY
Payer: COMMERCIAL

## 2025-06-11 VITALS — DIASTOLIC BLOOD PRESSURE: 76 MMHG | SYSTOLIC BLOOD PRESSURE: 101 MMHG | OXYGEN SATURATION: 98 % | HEART RATE: 74 BPM

## 2025-06-11 DIAGNOSIS — G81.91 RIGHT HEMIPARESIS (H): ICD-10-CM

## 2025-06-11 DIAGNOSIS — G35 MULTIPLE SCLEROSIS (H): ICD-10-CM

## 2025-06-11 DIAGNOSIS — G35 MS (MULTIPLE SCLEROSIS) (H): Primary | ICD-10-CM

## 2025-06-11 DIAGNOSIS — R26.0 ATAXIC GAIT: ICD-10-CM

## 2025-06-11 PROCEDURE — A9585 GADOBUTROL INJECTION: HCPCS | Performed by: RADIOLOGY

## 2025-06-11 PROCEDURE — 72156 MRI NECK SPINE W/O & W/DYE: CPT | Performed by: RADIOLOGY

## 2025-06-11 PROCEDURE — 72157 MRI CHEST SPINE W/O & W/DYE: CPT | Performed by: RADIOLOGY

## 2025-06-11 PROCEDURE — G0463 HOSPITAL OUTPT CLINIC VISIT: HCPCS | Performed by: PSYCHIATRY & NEUROLOGY

## 2025-06-11 PROCEDURE — 70553 MRI BRAIN STEM W/O & W/DYE: CPT | Performed by: RADIOLOGY

## 2025-06-11 RX ORDER — GADOBUTROL 604.72 MG/ML
7.5 INJECTION INTRAVENOUS ONCE
Status: COMPLETED | OUTPATIENT
Start: 2025-06-11 | End: 2025-06-11

## 2025-06-11 RX ADMIN — GADOBUTROL 5.5 ML: 604.72 INJECTION INTRAVENOUS at 09:38

## 2025-06-11 ASSESSMENT — PAIN SCALES - GENERAL: PAINLEVEL_OUTOF10: MODERATE PAIN (4)

## 2025-06-11 NOTE — PATIENT INSTRUCTIONS
Your MRI revealed multiple new lesions in the brainstem     I am concerned that you are at high risk of losing your ability to walk     I strongly recommend that you start a treatment for MS -- ocrevus, kesimpta, or briumvi would be the preferred medications     Meet with the pharmacist to learn more     Schedule a follow up with me in 4-6 months

## 2025-06-11 NOTE — LETTER
6/11/2025       RE: Ramonita Trujillo  [address]  [place] [address]     Dear Colleague,    Thank you for referring your patient, Ramonita Trujillo, to the [facility] [place] at [facility]. Please see a copy of my visit note below.    Date of Service: 6/11/2025    [facility] Neurology   MS Clinic Evaluation    Subjective: 35-year-old woman who presents for evaluation of multiple sclerosis.    She continues to work at [employer].  She notices that when she walks her right foot will catch.  She will sometimes need to use her arm to lift her right leg.  She is able to tolerate standing for the duration of her shift, though notes that she will lean on the counter.  She has adequate fine motor control to perform her duties as a .  However after a shift to working she will utilize a motorized cart to go shopping at the end of the day.    She has observed that her right arm is shaking more.  She will need to pin down the right arm to do some fine motor activities.    She denies any change in bladder or bowel function.  If anything bladder control might be a little bit better.  She is not needing to wear a pad as often.    She denies any diplopia.    She notes that over the past couple years people have asked her about her speech.    Disease onset: age 27, urinary urgency, gait instability    DMD hx:   Copaxone never tried    No Known Allergies    Current Outpatient Medications   Medication Sig Dispense Refill     diazepam (VALIUM) 5 MG tablet Take 1-2 tablets 30 minutes before MRI, 3rd tablet if needed. No driving for 8 hours after taking. 3 tablet 0     No current facility-administered medications for this visit.        Past medical, surgical, social and family history was personally reviewed. Pertinent details noted above.     Physical Examination:   /76 (BP Location: Left arm, Patient Position: Sitting, Cuff Size:  Adult Regular)   Pulse 74   SpO2 98%     General: no acute distress  Cranial nerves:   VFFC  PERRL w/no RAPD  EOM full w/R MORENA   Face symmetric  Hearing intact  Ataxic dysarthria   Motor:   Tone is normal   Bulk is normal                           R          L  Deltoid             5          5  Biceps             5          5  Triceps            5          5  Wrist ext          5          5  Finger ext        5          5  Finger abd       5          5     Hip flexion       4-         4+  Knee flexion    5-         5  Knee ext          5          5  Ankle d/f          4+        5     Reflexes: 2+ UE, 3+ LE   Sensory: vibration is absent in the R ankle, mod reduced in the left ankel, mildly reduced in the knees  Romberg is present  Coordination: moderate ataxia of LLE, severe ataxia of RLE  Sensory ataxia of RUE   Gait: ataxic gait with right circumduction, tandem severely impaired      Tests/Imaging:     Vitamin D 19->33  JCV Ab 1.39      MRI Brain  9/2020 - high lesion burden with mix of lesions in the periventricular region, deep white matter, charu, T1 holes, gd-  1/2021 - no definite new lesions, gd-   12/2022 - no new lesions, gd-   12/2023 - no new lesions, gd-   6/2025-multiple new lesions in kellie and midbrain, CHUCK negative    MRI Cervical spine   9/2020 - multiple small eccentric cord lesions, quality of image not the best, gd-  1/2021 - no definite new lesions, gd-   12/2022 - no new lesions, gd-   12/2023 - no new lesions,gd-  6/2025-one new lesion, gd-     MRI Thoracic spine   9/2020 - again quality not the best, it does appears that there are multiple small lesions, gd-   12/2022 -  No definite new lesions, gd-, lesions noted at T5-6, T6-7, and T 10-11  12/2023 - no new lesions, gd-  6/2025 - no new lesions, gd-     Assessment: 35-year-old woman with relapsing remitting multiple sclerosis who remains off of disease modifying therapy.  MRI does reveal new lesions.  Her examination has declined.    I  strongly encouraged her to start disease modifying therapy.  Ideally with a B-cell depleting medication.  We briefly discussed risks and side effects.    I am particularly concerned about her remaining off of disease modifying therapy.  She is at high risk of losing her ability to walk within the next few years given the lesion burden and her clinical examination.  We did discuss how treatment would impact her course of illness.    Plan:   -MTM visit  - Follow-up in 4 to 6 months    Note was completed with the assistance of Dragon Fluency software which can often result in accidental word substitutions.     A total of 30 minutes on the date of service were spent in the care of this patient.   Reena Ortega MD on 6/11/2025 at 11:21 AM                    Again, thank you for allowing me to participate in the care of your patient.      Sincerely,    Reena Ortega MD

## 2025-06-11 NOTE — PROGRESS NOTES
Date of Service: 6/11/2025    [facility] Neurology   MS Clinic Evaluation    Subjective: 35-year-old woman who presents for evaluation of multiple sclerosis.    She continues to work at [employer].  She notices that when she walks her right foot will catch.  She will sometimes need to use her arm to lift her right leg.  She is able to tolerate standing for the duration of her shift, though notes that she will lean on the counter.  She has adequate fine motor control to perform her duties as a .  However after a shift to working she will utilize a motorized cart to go shopping at the end of the day.    She has observed that her right arm is shaking more.  She will need to pin down the right arm to do some fine motor activities.    She denies any change in bladder or bowel function.  If anything bladder control might be a little bit better.  She is not needing to wear a pad as often.    She denies any diplopia.    She notes that over the past couple years people have asked her about her speech.    Disease onset: age 27, urinary urgency, gait instability    DMD hx:   Copaxone never tried    No Known Allergies    Current Outpatient Medications   Medication Sig Dispense Refill    diazepam (VALIUM) 5 MG tablet Take 1-2 tablets 30 minutes before MRI, 3rd tablet if needed. No driving for 8 hours after taking. 3 tablet 0     No current facility-administered medications for this visit.        Past medical, surgical, social and family history was personally reviewed. Pertinent details noted above.     Physical Examination:   /76 (BP Location: Left arm, Patient Position: Sitting, Cuff Size: Adult Regular)   Pulse 74   SpO2 98%     General: no acute distress  Cranial nerves:   VFFC  PERRL w/no RAPD  EOM full w/R MORENA   Face symmetric  Hearing intact  Ataxic dysarthria   Motor:   Tone is normal   Bulk is normal                           R          L  Deltoid             5          5  Biceps             5           5  Triceps            5          5  Wrist ext          5          5  Finger ext        5          5  Finger abd       5          5     Hip flexion       4-         4+  Knee flexion    5-         5  Knee ext          5          5  Ankle d/f          4+        5     Reflexes: 2+ UE, 3+ LE   Sensory: vibration is absent in the R ankle, mod reduced in the left ankel, mildly reduced in the knees  Romberg is present  Coordination: moderate ataxia of LLE, severe ataxia of RLE  Sensory ataxia of RUE   Gait: ataxic gait with right circumduction, tandem severely impaired      Tests/Imaging:     Vitamin D 19->33  JCV Ab 1.39      MRI Brain  9/2020 - high lesion burden with mix of lesions in the periventricular region, deep white matter, charu, T1 holes, gd-  1/2021 - no definite new lesions, gd-   12/2022 - no new lesions, gd-   12/2023 - no new lesions, gd-   6/2025-multiple new lesions in kellie and midbrain, CHUCK negative    MRI Cervical spine   9/2020 - multiple small eccentric cord lesions, quality of image not the best, gd-  1/2021 - no definite new lesions, gd-   12/2022 - no new lesions, gd-   12/2023 - no new lesions,gd-  6/2025-one new lesion, gd-     MRI Thoracic spine   9/2020 - again quality not the best, it does appears that there are multiple small lesions, gd-   12/2022 -  No definite new lesions, gd-, lesions noted at T5-6, T6-7, and T 10-11  12/2023 - no new lesions, gd-  6/2025 - no new lesions, gd-     Assessment: 35-year-old woman with relapsing remitting multiple sclerosis who remains off of disease modifying therapy.  MRI does reveal new lesions.  Her examination has declined.    I strongly encouraged her to start disease modifying therapy.  Ideally with a B-cell depleting medication.  We briefly discussed risks and side effects.    I am particularly concerned about her remaining off of disease modifying therapy.  She is at high risk of losing her ability to walk within the next few years given the lesion  burden and her clinical examination.  We did discuss how treatment would impact her course of illness.    Plan:   -MTM visit  - Follow-up in 4 to 6 months    Note was completed with the assistance of Dragon Fluency software which can often result in accidental word substitutions.     A total of 30 minutes on the date of service were spent in the care of this patient.   Reena Ortega MD on 6/11/2025 at 11:21 AM

## 2025-06-11 NOTE — NURSING NOTE
Chief Complaint   Patient presents with    MS    RECHECK     6 month follow up      Vitals were taken and medications were reconciled.   Kei Herrera, EMT  10:37 AM